# Patient Record
Sex: FEMALE | Race: WHITE | NOT HISPANIC OR LATINO | Employment: OTHER | URBAN - METROPOLITAN AREA
[De-identification: names, ages, dates, MRNs, and addresses within clinical notes are randomized per-mention and may not be internally consistent; named-entity substitution may affect disease eponyms.]

---

## 2017-01-16 ENCOUNTER — TELEPHONE (OUTPATIENT)
Dept: NEUROLOGY | Facility: CLINIC | Age: 57
End: 2017-01-16

## 2017-01-16 NOTE — TELEPHONE ENCOUNTER
Prior Authorization Specialty Medication Request    Medication/Dose: Rebif 44mcg  Frequency: Three times weekly     Route: Subcutaneously   Diagnosis and ICD: Relapsing Remitting Multiple Sclerosis, G35  New/Renewal/Insurance Change PA: Renewal    Important Lab Values: n/a    Previously Tried and Failed Therapies: None    Rationale: Continuation of current disease modifying therapy for demyelinating disease, currently clinically and radiologically stable on, please approve.    Would you like to include any research articles? n/a   If yes please include the hyperlink(s) below or fax @ [phone].    (Include Name and MRN)    If you received a fax notification from an outside Pharmacy;  Pharmacy Name: n/a  Pharmacy #: n/a  Pharmacy Fax: n/a    Patient's ID: [id number]

## 2017-01-16 NOTE — TELEPHONE ENCOUNTER
[facility] Prior Authorization Team   Phone: [phone]  Fax: [phone]      RECEIVED QUESTION SET. COMPLETED BELOW FORM AND MANUALLY FAXED TO [facility].

## 2017-01-17 NOTE — TELEPHONE ENCOUNTER
[facility] Prior Authorization Team   Phone: [phone]  Fax: [phone]    Prior Authorization Approval    Authorization Effective Date: 1/16/2017  Authorization Expiration Date: 1/16/2019  Medication: Rebif 44mcg - Approved  Approved Dose/Quantity: 6 per 28 days  Reference #:     Insurance Company: [employer]  Expected CoPay:       CoPay Card Available:      Foundation Assistance Needed:    Which Pharmacy is filling the prescription (Not needed for infusion/clinic administered): [facility] - [address]

## 2017-01-19 ENCOUNTER — TELEPHONE (OUTPATIENT)
Dept: NEUROLOGY | Facility: CLINIC | Age: 57
End: 2017-01-19

## 2017-01-19 NOTE — TELEPHONE ENCOUNTER
I received a VMM from Lydia stating that she is having issues getting her medications; I called her, and she started to explain it to me, but I advised that I would Jazmín in our pharmacy department call her; I advised her to call [facility] in the meantime to get more of her Rebif, as she is down to 1 syringe; Lydia was fine with this; I talked with Jazmín, and she said that this is complicated and that she will call Lydia to go through this with her, and then let me know if there is anything I need to do.    Milana Lewis, MS RN Care Coordinator

## 2017-01-20 NOTE — TELEPHONE ENCOUNTER
Left message for Lydia to discuss how to obtain Ampyra and Rebif. Appears to be a pharmacy insurance change to [facility]. The Rebif was approved and we should complete the Ampyra PA through [facility] too. Possible new pharmacy to [facility] also.

## 2017-01-23 ENCOUNTER — MEDICAL CORRESPONDENCE (OUTPATIENT)
Dept: HEALTH INFORMATION MANAGEMENT | Facility: CLINIC | Age: 57
End: 2017-01-23

## 2017-01-23 ENCOUNTER — TELEPHONE (OUTPATIENT)
Dept: NEUROLOGY | Facility: CLINIC | Age: 57
End: 2017-01-23

## 2017-01-23 NOTE — TELEPHONE ENCOUNTER
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    Would you like to include any research articles? n/a   If yes please include the hyperlink(s) below or fax @ [phone].    (Include Name and MRN)    If you received a fax notification from an outside Pharmacy;  Pharmacy Name: n/a  Pharmacy #: n/a  Pharmacy Fax: n/a

## 2017-01-24 ENCOUNTER — OFFICE VISIT (OUTPATIENT)
Dept: NEUROLOGY | Facility: CLINIC | Age: 57
End: 2017-01-24
Attending: PSYCHIATRY & NEUROLOGY
Payer: COMMERCIAL

## 2017-01-24 VITALS
DIASTOLIC BLOOD PRESSURE: 66 MMHG | BODY MASS INDEX: 18.06 KG/M2 | HEART RATE: 77 BPM | SYSTOLIC BLOOD PRESSURE: 111 MMHG | WEIGHT: 108.4 LBS | HEIGHT: 65 IN

## 2017-01-24 DIAGNOSIS — G35 MULTIPLE SCLEROSIS (H): Primary | ICD-10-CM

## 2017-01-24 DIAGNOSIS — G35 MULTIPLE SCLEROSIS (H): ICD-10-CM

## 2017-01-24 DIAGNOSIS — R26.9 NEUROLOGIC GAIT DISORDER: ICD-10-CM

## 2017-01-24 DIAGNOSIS — F09 COGNITIVE DYSFUNCTION ACCOMPANYING MULTIPLE SCLEROSIS (H): ICD-10-CM

## 2017-01-24 DIAGNOSIS — G35 COGNITIVE DYSFUNCTION ACCOMPANYING MULTIPLE SCLEROSIS (H): ICD-10-CM

## 2017-01-24 LAB
ALBUMIN SERPL-MCNC: 3.8 G/DL (ref 3.4–5)
ALP SERPL-CCNC: 93 U/L (ref 40–150)
ALT SERPL W P-5'-P-CCNC: 28 U/L (ref 0–50)
ANION GAP SERPL CALCULATED.3IONS-SCNC: 7 MMOL/L (ref 3–14)
AST SERPL W P-5'-P-CCNC: 23 U/L (ref 0–45)
BASOPHILS # BLD AUTO: 0 10E9/L (ref 0–0.2)
BASOPHILS NFR BLD AUTO: 0.2 %
BILIRUB SERPL-MCNC: 0.3 MG/DL (ref 0.2–1.3)
BUN SERPL-MCNC: 15 MG/DL (ref 7–30)
CALCIUM SERPL-MCNC: 8.8 MG/DL (ref 8.5–10.1)
CHLORIDE SERPL-SCNC: 104 MMOL/L (ref 94–109)
CO2 SERPL-SCNC: 29 MMOL/L (ref 20–32)
CREAT SERPL-MCNC: 0.58 MG/DL (ref 0.52–1.04)
DIFFERENTIAL METHOD BLD: NORMAL
EOSINOPHIL # BLD AUTO: 0 10E9/L (ref 0–0.7)
EOSINOPHIL NFR BLD AUTO: 0.9 %
ERYTHROCYTE [DISTWIDTH] IN BLOOD BY AUTOMATED COUNT: 12.7 % (ref 10–15)
GFR SERPL CREATININE-BSD FRML MDRD: NORMAL ML/MIN/1.7M2
GLUCOSE SERPL-MCNC: 84 MG/DL (ref 70–99)
HCT VFR BLD AUTO: 37.3 % (ref 35–47)
HGB BLD-MCNC: 12.5 G/DL (ref 11.7–15.7)
IMM GRANULOCYTES # BLD: 0 10E9/L (ref 0–0.4)
IMM GRANULOCYTES NFR BLD: 0.2 %
LYMPHOCYTES # BLD AUTO: 1.3 10E9/L (ref 0.8–5.3)
LYMPHOCYTES NFR BLD AUTO: 27.8 %
MCH RBC QN AUTO: 30 PG (ref 26.5–33)
MCHC RBC AUTO-ENTMCNC: 33.5 G/DL (ref 31.5–36.5)
MCV RBC AUTO: 89 FL (ref 78–100)
MONOCYTES # BLD AUTO: 0.4 10E9/L (ref 0–1.3)
MONOCYTES NFR BLD AUTO: 8.2 %
NEUTROPHILS # BLD AUTO: 2.8 10E9/L (ref 1.6–8.3)
NEUTROPHILS NFR BLD AUTO: 62.7 %
NRBC # BLD AUTO: 0 10*3/UL
NRBC BLD AUTO-RTO: 0 /100
PLATELET # BLD AUTO: 182 10E9/L (ref 150–450)
POTASSIUM SERPL-SCNC: 4.4 MMOL/L (ref 3.4–5.3)
PROT SERPL-MCNC: 7.9 G/DL (ref 6.8–8.8)
RBC # BLD AUTO: 4.17 10E12/L (ref 3.8–5.2)
SODIUM SERPL-SCNC: 140 MMOL/L (ref 133–144)
WBC # BLD AUTO: 4.5 10E9/L (ref 4–11)

## 2017-01-24 PROCEDURE — 36415 COLL VENOUS BLD VENIPUNCTURE: CPT | Performed by: PSYCHIATRY & NEUROLOGY

## 2017-01-24 PROCEDURE — 80053 COMPREHEN METABOLIC PANEL: CPT | Performed by: PSYCHIATRY & NEUROLOGY

## 2017-01-24 PROCEDURE — 85025 COMPLETE CBC W/AUTO DIFF WBC: CPT | Performed by: PSYCHIATRY & NEUROLOGY

## 2017-01-24 PROCEDURE — 40000809 ZZH STATISTIC NO DOCUMENTATION TO SUPPORT CHARGE

## 2017-01-24 ASSESSMENT — PAIN SCALES - GENERAL: PAINLEVEL: MODERATE PAIN (5)

## 2017-01-24 NOTE — Clinical Note
1/24/2017       RE: Lydia William  [address] [place] [address]     Dear Colleague,    Thank you for referring your patient, Lydia William, to the [facility] MULTIPLE SCLEROSIS at [facility]. Please see a copy of my visit note below.    This office note has been dictated.     Again, thank you for allowing me to participate in the care of your patient.      Sincerely,    Mumtaz Doan, DO

## 2017-01-24 NOTE — MR AVS SNAPSHOT
After Visit Summary   1/24/2017    Lydia William    MRN: [id number]           Patient Information     Date Of Birth          1960        Visit Information        Provider Department      1/24/2017 1:30 PM Mumtaz Doan DO [facility] Multiple Sclerosis        Today's Diagnoses     Multiple sclerosis (H)    -  1     Neurologic gait disorder         Cognitive dysfunction accompanying multiple sclerosis (H)            Follow-ups after your visit        Additional Services     NEUROPSYCHOLOGY REFERRAL       Your provider has referred you to:    [facility]: Adult Neuropsychology Clinic (Mental Health Services) - [place] [phone]   [email or web address]    All scheduling is subject to the client's specific insurance plan & benefits, provider/location availability, and provider clinical specialities.  Please arrive 15 minutes early for your first appointment and bring your completed paperwork.    Please be aware that coverage of these services is subject to the terms and limitations of your health insurance plan.  Call member services at your health plan with any benefit or coverage questions.    Please bring the following to your appointment:  >>   Any x-rays, CTs or MRIs which have been performed.  Contact the facility where they were done to arrange for  prior to your scheduled appointment.  Any new CT, MRI or other procedures ordered by your specialist must be performed at a [place] facility or coordinated by your clinic's referral office.    >>   List of current medications   >>   This referral request   >>   Any documents/labs given to you for this referral            PT Referral ([place])       *This therapy referral will be filtered to a centralized scheduling office at [facility] and the patient will receive a call to schedule an appointment at a [place] location most convenient for them. *     [facility]  Services provides Physical Therapy evaluation and treatment and many specialty services across the [facility].  If requesting a specialty program, please choose from the list below.    If you have not heard from the scheduling office within 2 business days, please call [phone] for all locations, with the exception of [place], please call [phone].  Treatment: Evaluation & Treatment  Special Instructions/Modalities: gait assessment training strengthening rom  Special Programs: none    Please be aware that coverage of these services is subject to the terms and limitations of your health insurance plan.  Call member services at your health plan with any benefit or coverage questions.      **Note to Provider:  If you are referring outside of [place] for the therapy appointment, please list the name of the location in the  special instructions  above, print the referral and give to the patient to schedule the appointment.                  Your next 10 appointments already scheduled     Jan 24, 2017  2:00 PM   Lab with UC LAB   [facility] ([facility])    [address] [place] [address]  [address]st [facility] [address] [phone]            Jul 24, 2017  1:30 PM   (Arrive by 1:15 PM)   Return Multiple Sclerosis with RICHARD Linares CNP   [facility] Multiple Sclerosis ([facility])    [address] [place] [address]  3rd [facility] [address] [phone]              Future tests that were ordered for you today     Open Future Orders        Priority Expected Expires Ordered    Comprehensive metabolic panel Routine  1/24/2018 1/24/2017            Who to contact     If you have questions or need follow up information about today's clinic visit or your schedule please contact [facility] MULTIPLE SCLEROSIS directly at [phone].  Normal or non-critical lab and imaging results will be communicated to you by MyChart, letter or phone within 4 business days  "after the clinic has received the results. If you do not hear from us within 7 days, please contact the clinic through [employer] or phone. If you have a critical or abnormal lab result, we will notify you by phone as soon as possible.  Submit refill requests through [employer] or call your pharmacy and they will forward the refill request to us. Please allow 3 business days for your refill to be completed.          Additional Information About Your Visit        Spon[employer] Information     [employer] lets you send messages to your doctor, view your test results, renew your prescriptions, schedule appointments and more. To sign up, go to [email or web address] . Click on \"Log in\" on the left side of the screen, which will take you to the Welcome page. Then click on \"Sign up Now\" on the right side of the page.     You will be asked to enter the access code listed below, as well as some personal information. Please follow the directions to create your username and password.     Your access code is: [email or web address]  Expires: 2017  1:50 PM     Your access code will  in 90 days. If you need help or a new code, please call your [place] clinic or [phone].        Care EveryWhere ID     This is your Care EveryWhere ID. This could be used by other organizations to access your [place] medical records  [id number]        Your Vitals Were     Pulse Height BMI (Body Mass Index)             77 1.645 m (5' 4.75\") 18.17 kg/m2          Blood Pressure from Last 3 Encounters:   17 111/66   16 103/65   16 111/41    Weight from Last 3 Encounters:   17 49.17 kg (108 lb 6.4 oz)   05/14/15 52.164 kg (115 lb)   14 52.663 kg (116 lb 1.6 oz)              We Performed the Following     CBC with platelets differential     NEUROPSYCHOLOGY REFERRAL     PT Referral ([place])        Primary Care Provider Office Phone # Fax #    Garry Echavarria [phone]       [facility] [address] [place] [address]        Thank you!     Thank you for choosing [facility] MULTIPLE SCLEROSIS  for your care. Our goal is always to provide you with excellent care. Hearing back from our patients is one way we can continue to improve our services. Please take a few minutes to complete the written survey that you may receive in the mail after your visit with us. Thank you!             Your Updated Medication List - Protect others around you: Learn how to safely use, store and throw away your medicines at www.disposemymeds.org.          This list is accurate as of: 1/24/17  1:50 PM.  Always use your most recent med list.                   Brand Name Dispense Instructions for use    baclofen 10 MG tablet    LIORESAL    270 tablet    Take 1 tablet (10 mg) by mouth 3 times daily       Dalfampridine 10 MG Tb12     180 tablet    Take 1 tablet (10 mg) by mouth 2 times daily       ibuprofen 200 MG tablet    ADVIL/MOTRIN     Take 1-3 tablets by mouth daily as needed.       interferon beta-1a 44 MCG/0.5ML injection    REBIF    12 Syringe    Inject 0.5 mLs (44 mcg) Subcutaneous three times a week       multivitamin per tablet      Take 1 tablet by mouth daily.

## 2017-01-24 NOTE — Clinical Note
1/24/2017      RE: Lydia William  [address] [place] [address]       HISTORY OF PRESENT ILLNESS:  Followup appointment on Lydia William, whom I see for multiple sclerosis.  The patient states overall she is not doing as well.  Unfortunately, she lost her job and was laid off.  She has not been walking as much in the last month; hence, her balance is even worse, and it has been getting worse over time.  She has not had any physical therapy for about 4 years.  Back in October, she was walking and getting her lunch ready before she went to work, and her toe caught on the rug.  She fell and hit the right side of her face on the table and sustained multiple facial fractures.  She has recovered from that.  Injections with Rebif are going well.  She has some issues with getting her medications on time and causes her a great deal of stress and anxiety.  We did spend a great deal of time going over what to do if she is having issues with getting her medications and the fact that if she misses a dose or 2 that it would not be significantly detrimental to her health.  She has no other acute issues.      MEDICATIONS:  Reviewed and up-to-date in Epic.      REVIEW OF SYSTEMS:  As per History of Present Illness.      PHYSICAL EXAMINATION:   VITAL SIGNS:  Blood pressure 111/66, pulse 77, weight 108 pounds.   GENERAL:  A pleasant, well-developed, well-nourished female in no apparent distress.   CRANIAL NERVES:  II-XII are intact with the exception of chronic lid lag, which has been present for many years.   MOTOR:  Strength is 5/5 in all extremities except for left lower extremity with hip flexors +4/5, knee extensors 5/5, knee flexors 5/5, dorsiflexors +4/5 on the left.   CEREBELLAR:  Accurate finger-to-nose.      IMPRESSION:  Relapsing-remitting multiple sclerosis, stable on Rebif.      PLAN:   1.  CBC and hepatic profile.   2.  Neuropsychological evaluation for her cognitive issues and its ability to impact her  ability to work.   3.  I am going to refer the patient to Physical Therapy for gait stability, assessment training, strengthening and stretching.  The patient is going to follow up in about 6 months with Claudia.         LISA PARR DO             D: 2017 13:54   T: 2017 10:01   MT: eliza      Name:     ALAN JOHNSON   MRN:      [id number]        Account:      [id number]   :      1960           Service Date: 2017      Document: [id number]      This office note has been dictated.

## 2017-01-25 NOTE — PROGRESS NOTES
HISTORY OF PRESENT ILLNESS:  Followup appointment on Lydia William, whom I see for multiple sclerosis.  The patient states overall she is not doing as well.  Unfortunately, she lost her job and was laid off.  She has not been walking as much in the last month; hence, her balance is even worse, and it has been getting worse over time.  She has not had any physical therapy for about 4 years.  Back in October, she was walking and getting her lunch ready before she went to work, and her toe caught on the rug.  She fell and hit the right side of her face on the table and sustained multiple facial fractures.  She has recovered from that.  Injections with Rebif are going well.  She has some issues with getting her medications on time and causes her a great deal of stress and anxiety.  We did spend a great deal of time going over what to do if she is having issues with getting her medications and the fact that if she misses a dose or 2 that it would not be significantly detrimental to her health.  She has no other acute issues.      MEDICATIONS:  Reviewed and up-to-date in Epic.      REVIEW OF SYSTEMS:  As per History of Present Illness.      PHYSICAL EXAMINATION:   VITAL SIGNS:  Blood pressure 111/66, pulse 77, weight 108 pounds.   GENERAL:  A pleasant, well-developed, well-nourished female in no apparent distress.   CRANIAL NERVES:  II-XII are intact with the exception of chronic lid lag, which has been present for many years.   MOTOR:  Strength is 5/5 in all extremities except for left lower extremity with hip flexors +4/5, knee extensors 5/5, knee flexors 5/5, dorsiflexors +4/5 on the left.   CEREBELLAR:  Accurate finger-to-nose.      IMPRESSION:  Relapsing-remitting multiple sclerosis, stable on Rebif.      PLAN:   1.  CBC and hepatic profile.   2.  Neuropsychological evaluation for her cognitive issues and its ability to impact her ability to work.   3.  I am going to refer the patient to Physical Therapy for gait  stability, assessment training, strengthening and stretching.  The patient is going to follow up in about 6 months with Claudia.         LISA PARR DO             D: 2017 13:54   T: 2017 10:01   MT: eliza      Name:     ALAN JOHNSON   MRN:      [id number]        Account:      [id number]   :      1960           Service Date: 2017      Document: [id number]

## 2017-01-26 ENCOUNTER — HOSPITAL ENCOUNTER (OUTPATIENT)
Dept: PHYSICAL THERAPY | Facility: CLINIC | Age: 57
Setting detail: THERAPIES SERIES
End: 2017-01-26
Attending: PSYCHIATRY & NEUROLOGY
Payer: COMMERCIAL

## 2017-01-26 DIAGNOSIS — G35 MS (MULTIPLE SCLEROSIS) (H): Primary | ICD-10-CM

## 2017-01-26 PROCEDURE — 97110 THERAPEUTIC EXERCISES: CPT | Mod: GP | Performed by: PHYSICAL THERAPIST

## 2017-01-26 PROCEDURE — 97112 NEUROMUSCULAR REEDUCATION: CPT | Mod: GP | Performed by: PHYSICAL THERAPIST

## 2017-01-26 PROCEDURE — 97161 PT EVAL LOW COMPLEX 20 MIN: CPT | Mod: GP | Performed by: PHYSICAL THERAPIST

## 2017-01-26 PROCEDURE — 40000719 ZZHC STATISTIC PT DEPARTMENT NEURO VISIT: Performed by: PHYSICAL THERAPIST

## 2017-01-26 NOTE — PROGRESS NOTES
01/26/17 1000   Signing Clinician's Name / Credentials   Signing clinician's name / credentials Rodriguez Omer PT   Dynamic Gait Index (Louis and Torres [place], 1995)   Gait Level Surface 2  (gait deviation)   Change in Gait Speed 1  (minor change in walking speed)   Gait and Horizontal Head Turns 3   Gait with Vertical Head Turns 3   Gait and Pivot Turns 3   Step Over Obstacle 3   Step Around Obstacles 3   Steps 2   Total Dynamic Gait Index Score  (A score of 19 or less has been correlated to an increased risk of falls in community dwelling older adults, patients with vestibular disorders, and patients with MS.)   Total Score (out of 24) 20

## 2017-01-28 NOTE — PROGRESS NOTES
" 01/26/17 0800   Quick Adds   Type of Visit Initial OP PT Evaluation   General Information   Start of Care Date 01/26/17   Referring Physician Dr. Mumtaz Daon   Orders Evaluate and Treat as Indicated   Order Date 01/24/17   Medical Diagnosis MS, neurologic gait disorder   Onset of illness/injury or Date of Surgery 01/01/87   Precautions/Limitations (MS)   Surgical/Medical history reviewed Yes   Pertinent history of current problem (include personal factors and/or comorbidities that impact the POC) Lydia presents with orders as above. She has a history of MS.  Dx in 1987.  She takes baclofen. She received PT in 2012.  She has a history of Cholecystectomy.  She reports decline in functional activity tolerance.   She fell in Oct catching her toe on the carpet, sustained multiple facial fractures on the right.    Pertinent Visual History  states that when first diagnosed with MS she had double vision but states that has resolved and doesn't have visual impairments currently.    Prior level of function comment Difficulty with gait and balance, continues to have this .  She gets fatigued easier and is having greater difficulty tolerating sitting or standing for periods of time.  HOwever, she states that standing is after 2 hours.    Previous/Current Treatment Physical Therapy   Improvement after PT Moderate   Current Community Support Family/friend caregiver  (yard service - [facility] home. )   Patient role/Employment history Unemployed;Disabled   [facility]   Home/Community Accessibility Comments town home 2 stair entry without a rail,  flight of stairs within home iwth rail   Current Assistive Devices (hAS A 4ww)   Assistive Devices Comments has a 4WW but doesn't use it.     Patient/Family Goals Statement Goals - \"not really sure.\"  \"wasnt' thinking about PT but Dr. Doan thought it would be good to come in for PT as it has been a few years since the last time\"  States she lost her job due to a work " force reduction.  She states that however towards the end she was having difficulty walking from the parking lot into the facilitu  about a block and a half.   States that she can tolerate standing for about a couple hours but then really feels it and points to the thighs.  If stands for a long time she states it is harder to walk after standing about 2 hours.    General Information Comments Heat and extreme cold exacerbate her symptoms with MS.   Decreased function.    Fall Risk Screen   Fall screen completed by PT   Per patient - Fall 2 or more times in past year? Yes  (fell 10/18/16 getting lunch ready caught toe. )   Per patient - Fall with injury in past year? Yes   Is patient a fall risk? Yes   Fall screen comments multiple fascial fractures with the Oct fall   System Outcome Measures   AM-PAC  Basic Mobility Score Level  (Lower scores equate to lower levels of function) 54.67   AM-PAC  Daily Activity Score Level  (Lower scores equate to lower levels of function) 56.66   AM-PAC  Applied Cognitive Score Level  (Lower scores equate to lower levels of function) 43.4   Pain   Patient currently in pain No   Cognitive Status Examination   Orientation orientation to person, place and time   Level of Consciousness alert   Personal Safety and Judgment intact   Cognitive Comment states she has to write things down,  she will be having a neuropsych test    Integumentary   Integumentary Comments no issues   Posture   Posture Comments sitting rounded shoulders, forward head. standing pelvicshift to the left. R gr. trochanter lower on the right,  shoulder higher right,  anterior pelvic tilt with increased lumbar lordosis.    Range of Motion (ROM)   ROM Comment decreased ankle dorsiflexion lacks about 5 degrees. on the left and right to neutral passively  possiblehip flexor tightness    Strength   Strength Comments strength hip flexion 3+-4/5 B, knee extension 5/5 B, R hamstring sitting manual resistance 4-/5,  L 4/5, ankle  dorsiflexion 5/5 B.    Prone knee flexion 4/5 L   R4+/5,  hip abduction 4/5 B,  hip extension 4/5 B.   Functionally decreased hip stabilizer strength, decreased activity tolerance with fatigue.   decreased pelvic floor activation iwth pelvictilt   Standing single heel raise x 7 B lightUE support.   Decreased hasmtring activation noted on the right with standing balance challenges.    Bed Mobility   Bed Mobility Comments states independent but benifits from stretching before getting out of bed in the morning  to loosen up   Transfer Skills   Transfer Comments IND wihtout use of UE withsit to stand.  Keeps COM slighlty posterior with initial sit,  slight L sided bias.    Locomotion   Wheel Chair Mobility Comments n/a   Gait   Gait Comments ambulation with wider ELADIO, forward COM and foot flat initial contact, decreased knee flexion throughout gait and B, rigid trunk and UE   Gait Special Tests 25 Foot Timed Walk   Seconds 7.81   Steps 15 Steps   Comments no device   Gait Special Tests Dynamic Gait Index   Score out of 24 20   Comments no device. see separate documentation   Balance   Balance Comments standing rhomberg good EO and EC   with passivemovement from therapist pt resists A/P  movements of COM over ELADIO.    Balance Special Tests Single Leg Stance Right,   Right, seconds 1.5 Seconds   Balance Special Tests Single Leg Stance Left   Left, seconds 0.87 Seconds   Balance Special Tests Sit to Stand Reps in 30 Seconds   Reps in 30 seconds 8   Height 18   Comments arms across the chest   Sensory Examination   Sensory Perception Comments states maybe a little numbness front of the legs and knee area.   more on the left but in the both   Coordination   Coordination Comments decreased control and coordination R and L with circles on the floor, alternate toe tapping .  slower motor processing for lower body motions.  Decreased interlimb coordination and grading of muscle activation with gait .    Muscle Tone   Muscle Tone  Comments possible 1-2 beat clonus on the left.    Modality Interventions   Planned Modality Interventions Comments per therapist discrestion including pool PT   Planned Therapy Interventions   Planned Therapy Interventions balance training;gait training;motor coordination training;neuromuscular re-education;prosthetic fitting/training;strengthening;stretching;ROM;transfer training;manual therapy   Clinical Impression   Criteria for Skilled Therapeutic Interventions Met yes, treatment indicated   PT Diagnosis decrease in functional activity tolerance and gait impairments.    Influenced by the following impairments weakness core, LE's , impaired coordination and motor planning LE, decreased balance control, muscle shortening LE   Functional limitations due to impairments decreased dynamic balance, fall risk, decreased tolerance to standing , sitting without symptoms of fatigue or LE discomfort.    Clinical Presentation Stable/Uncomplicated   Clinical Presentation Rationale no exacerbations with MS   Clinical Decision Making (Complexity) Low complexity   Therapy Frequency 1 time/week   Predicted Duration of Therapy Intervention (days/wks) 8 weeks   Risk & Benefits of therapy have been explained Yes   Patient, Family & other staff in agreement with plan of care Yes   Clinical Impression Comments Pt would benifit from OT eval and treat for further assessment of memory and cognitive strategies,  energy conservation    Education Assessment   Preferred Learning Style Listening;Reading;Demonstration;Pictures/video  (active participation)   Goal 1   Goal Identifier 1 HEP   Goal Description Lydia will be independent in appropriate HEP to address her impairments to improve her function and activity tolerance.    Target Date 03/24/17   Goal 2   Goal Identifier 2  DGI   Goal Description Lydia will increase her DGI score to 22/24 or greater to demonstrate improved dynamic gait balance and motor control for ADL's and gait safety    Target Date 03/24/17   Goal 3   Goal Identifier 3 SLS   Goal Description Lydia will perform SLS for 6 second or greater B LE to demonstrate improved functional LE strenght and balance for greater safety with gait, stairs, curbs, donning pants in standing.    Target Date 03/24/17   Goal 4   Goal Identifier 4  AMPAC   Goal Description Lydia to increase her basic mobility AMPAC score by 2 points or greater to demonstrate improved functional mobility.    Target Date 03/24/17   Goal 5   Goal Identifier 5-  [facility]   Goal Description Lydia to demonstrate the ability to  objects off the floor without LOB and without UE support and to subjectively report greater ease and confidence in doing so to decrease fall risk and increase her mobility and safety.    Target Date 03/24/17   Total Evaluation Time   Total Evaluation Time (Minutes) 35

## 2017-01-30 NOTE — TELEPHONE ENCOUNTER
[facility] Prior Authorization Team   Phone: [phone]  Fax: [phone]    PA Initiation    Medication: Ampyra 10mg  Insurance Company: [employer] - Phone [phone] Fax [phone]  Pharmacy Filling the Rx: [facility] - [address]  Filling Pharmacy Phone: [phone]  Filling Pharmacy Fax: [phone]  Start Date: 1/30/2017    Waiting on questions to generate in order to complete prior authorization initation.

## 2017-01-31 NOTE — TELEPHONE ENCOUNTER
PA Initiation    Medication: Ampyra 10mg  Insurance Company: [employer] - Phone [phone] Fax [phone]  Pharmacy Filling the Rx: [facility] - [address]  Filling Pharmacy Phone: [phone]  Filling Pharmacy Fax: [phone]  Start Date: 1/30/2017

## 2017-02-01 NOTE — TELEPHONE ENCOUNTER
PA Initiation    Medication: Ampyra 10mg - PA Not Needed  Insurance Company: [employer] - Phone [phone] Fax [phone]  Pharmacy Filling the Rx: [facility] - [address]  Filling Pharmacy Phone: [phone]  Filling Pharmacy Fax: [phone]  Start Date: 1/30/2017    Prescription is refill too soon until 2/9/17.

## 2017-02-02 ENCOUNTER — HOSPITAL ENCOUNTER (OUTPATIENT)
Dept: PHYSICAL THERAPY | Facility: CLINIC | Age: 57
Setting detail: THERAPIES SERIES
End: 2017-02-02
Attending: PSYCHIATRY & NEUROLOGY
Payer: COMMERCIAL

## 2017-02-02 PROCEDURE — 97110 THERAPEUTIC EXERCISES: CPT | Mod: GP | Performed by: PHYSICAL THERAPIST

## 2017-02-02 PROCEDURE — 40000719 ZZHC STATISTIC PT DEPARTMENT NEURO VISIT: Performed by: PHYSICAL THERAPIST

## 2017-02-09 ENCOUNTER — HOSPITAL ENCOUNTER (OUTPATIENT)
Dept: PHYSICAL THERAPY | Facility: CLINIC | Age: 57
Setting detail: THERAPIES SERIES
End: 2017-02-09
Attending: PSYCHIATRY & NEUROLOGY
Payer: COMMERCIAL

## 2017-02-09 PROCEDURE — 97110 THERAPEUTIC EXERCISES: CPT | Mod: GP | Performed by: PHYSICAL THERAPIST

## 2017-02-09 PROCEDURE — 40000719 ZZHC STATISTIC PT DEPARTMENT NEURO VISIT: Performed by: PHYSICAL THERAPIST

## 2017-02-09 NOTE — TELEPHONE ENCOUNTER
Prior Authorization Approval    Authorization Effective Date: 2/8/2017  Authorization Expiration Date:    Medication: Ampyra 10mg - PA Not Needed  Approved Dose/Quantity: 60 PER 30 DAYS  Reference #: [id number]   Insurance Company: [employer] - Phone [phone] Fax [phone]  Expected CoPay:       CoPay Card Available:      Foundation Assistance Needed:    Which Pharmacy is filling the prescription (Not needed for infusion/clinic administered): [facility] - [address] [facility]  Pharmacy Notified: Yes  Patient Notified: Yes

## 2017-02-10 ENCOUNTER — TELEPHONE (OUTPATIENT)
Dept: NEUROLOGY | Facility: CLINIC | Age: 57
End: 2017-02-10

## 2017-02-10 DIAGNOSIS — G35 MULTIPLE SCLEROSIS (H): Primary | ICD-10-CM

## 2017-02-10 RX ORDER — DALFAMPRIDINE 10 MG/1
10 TABLET, FILM COATED, EXTENDED RELEASE ORAL 2 TIMES DAILY
Qty: 180 TABLET | Refills: 3 | Status: SHIPPED | OUTPATIENT
Start: 2017-02-10 | End: 2017-08-17

## 2017-02-10 NOTE — TELEPHONE ENCOUNTER
Received refill request for Ampyra from [facility]; Patient was last seen in January and has follow up appointment in July with Claudia Larsen CNP; Refilled for 1 year per MS refill protocol.    Aimee Wilson MS RN Care Coordinator

## 2017-02-10 NOTE — TELEPHONE ENCOUNTER
[facility] Prior Authorization Team   Phone: [phone]  Fax: [phone]      Prior Authorization Approval    Authorization Effective Date: 2/7/2017  Authorization Expiration Date: 8/7/2017  Medication: Ampyra - Approved  Approved Dose/Quantity: 60 per 20 days  Reference #: [id number]   Insurance Company: [employer] ([facility]) - Phone [phone] Fax [phone]  Expected CoPay:       CoPay Card Available:      Foundation Assistance Needed:    Which Pharmacy is filling the prescription (Not needed for infusion/clinic administered):    Pharmacy Notified:    Patient Notified:

## 2017-02-10 NOTE — TELEPHONE ENCOUNTER
Received refill request for Rebif from [email or web address]; Patient was last seen in January by Dr Doan and has follow up appointment in July with Claudia Larsen; Refilled for 1 year per MS refill protocol.    Milana Lewis, MS RN Care Coordinator

## 2017-02-16 ENCOUNTER — HOSPITAL ENCOUNTER (OUTPATIENT)
Dept: PHYSICAL THERAPY | Facility: CLINIC | Age: 57
Setting detail: THERAPIES SERIES
End: 2017-02-16
Attending: PSYCHIATRY & NEUROLOGY
Payer: COMMERCIAL

## 2017-02-16 PROCEDURE — 40000719 ZZHC STATISTIC PT DEPARTMENT NEURO VISIT: Performed by: PHYSICAL THERAPIST

## 2017-02-16 PROCEDURE — 97112 NEUROMUSCULAR REEDUCATION: CPT | Mod: GP | Performed by: PHYSICAL THERAPIST

## 2017-02-16 PROCEDURE — 97110 THERAPEUTIC EXERCISES: CPT | Mod: GP | Performed by: PHYSICAL THERAPIST

## 2017-02-16 PROCEDURE — 97116 GAIT TRAINING THERAPY: CPT | Mod: GP | Performed by: PHYSICAL THERAPIST

## 2017-02-20 ENCOUNTER — OFFICE VISIT (OUTPATIENT)
Dept: NEUROPSYCHOLOGY | Facility: CLINIC | Age: 57
End: 2017-02-20

## 2017-02-20 DIAGNOSIS — G35 MS (MULTIPLE SCLEROSIS) (H): Primary | ICD-10-CM

## 2017-02-20 NOTE — NURSING NOTE
The patient was seen for neuropsychological evaluation at the request of Mumtaz Doan for the purposes of diagnostic clarification and treatment planning.  One hour of face-to-face testing were provided by this writer.  Please see Dr. Trinity Anaya's report for a full interpretation of the findings.  Bárbara Chase  Psychometrist

## 2017-02-20 NOTE — MR AVS SNAPSHOT
After Visit Summary   2/20/2017    Lydia William    MRN: [id number]           Patient Information     Date Of Birth          1960        Visit Information        Provider Department      2/20/2017 9:00 AM Trinity Anaya LP [facility] Neuropsychology        Today's Diagnoses     MS (multiple sclerosis) (H)    -  1       Follow-ups after your visit        Your next 10 appointments already scheduled     Mar 02, 2017  8:00 AM CST   Treatment 45 with Milana Omer, PT   [facility] ([facility]    [address] [address]   [phone]            Mar 09, 2017  9:00 AM CST   Treatment 45 with Milana mOer, PT   [facility]    [address] [address]   [phone]            Mar 16, 2017  9:00 AM CDT   Treatment 45 with Milana Omer, PT   [facility]    [address] [address]   [phone]            Mar 23, 2017  9:00 AM CDT   Treatment 45 with Milana Omer, PT   [facility]    [address] [address]   [phone]            Jul 27, 2017 12:00 PM CDT   (Arrive by 11:45 AM)   Return Multiple Sclerosis with RICHARD Linares CNP   [facility] Multiple Sclerosis ([facility])    [address] [place] [address] [phone]              Who to contact     Please call your clinic at [phone] to:    Ask questions about your health    Make or cancel appointments    Discuss your medicines    Learn about your test results    Speak to your doctor   If you have compliments or concerns about an experience at your clinic, or if you wish to file a complaint, please contact [facility] Patient Relations at [phone] or email us at  [email or web address]         Additional Information About Your Visit        [employer] Information     [employer] gives you secure access to your electronic health record. If you see a primary care provider, you can also send messages to your care team and make appointments. If you have questions, please call your primary care clinic.  If you do not have a primary care provider, please call [phone] and they will assist you.      VIVA is an electronic gateway that provides easy, online access to your medical records. With VIVA, you can request a clinic appointment, read your test results, renew a prescription or communicate with your care team.     To access your existing account, please contact your [facility] or call [phone] for assistance.        Care EveryWhere ID     This is your Care EveryWhere ID. This could be used by other organizations to access your [facility] medical records  [id number]         Blood Pressure from Last 3 Encounters:   01/24/17 111/66   07/27/16 103/65   01/06/16 111/41    Weight from Last 3 Encounters:   01/24/17 49.2 kg (108 lb 6.4 oz)   05/14/15 52.2 kg (115 lb)   11/20/14 52.7 kg (116 lb 1.6 oz)              We Performed the Following     [id number] TESTING, PER HR/PSYCHOLOGIST     NEUROPSYCH TESTING BY [facility]        Primary Care Provider Office Phone # Fax #    Garry Echavarria [phone]       [facility] [address] [place] [address]        Thank you!     Thank you for choosing [facility] NEUROP[facility]CH[facility]Y  for your care. Our goal is always to provide you with excellent care. Hearing back from our patients is one way we can continue to improve our services. Please take a few minutes to complete the written survey that you may receive in the mail after your visit with us. Thank you!             Your Updated Medication List - Protect others around you: Learn how to safely use, store and throw  away your medicines at www.disposemymeds.org.          This list is accurate as of: 2/20/17 11:59 PM.  Always use your most recent med list.                   Brand Name Dispense Instructions for use    baclofen 10 MG tablet    LIORESAL    270 tablet    Take 1 tablet (10 mg) by mouth 3 times daily       Dalfampridine 10 MG Tb12     180 tablet    Take 1 tablet (10 mg) by mouth 2 times daily       ibuprofen 200 MG tablet    ADVIL/MOTRIN     Take 1-3 tablets by mouth daily as needed.       interferon beta-1a 44 MCG/0.5ML injection    REBIF    12 Syringe    Inject 0.5 mLs (44 mcg) Subcutaneous three times a week       multivitamin per tablet      Take 1 tablet by mouth daily.

## 2017-02-23 ENCOUNTER — HOSPITAL ENCOUNTER (OUTPATIENT)
Dept: PHYSICAL THERAPY | Facility: CLINIC | Age: 57
Setting detail: THERAPIES SERIES
End: 2017-02-23
Attending: PSYCHIATRY & NEUROLOGY
Payer: COMMERCIAL

## 2017-02-23 PROCEDURE — 40000719 ZZHC STATISTIC PT DEPARTMENT NEURO VISIT: Performed by: PHYSICAL THERAPIST

## 2017-02-23 PROCEDURE — 97110 THERAPEUTIC EXERCISES: CPT | Mod: GP | Performed by: PHYSICAL THERAPIST

## 2017-03-01 NOTE — PROGRESS NOTES
Neuropsychology Laboratory  [address], [facility] [address]  [place], [place]  [id number] [phone]    NEUROPSYCHOLOGICAL EVALUATION      RELEVANT HISTORY AND REASON FOR REFERRAL:    Lydia William is a 56-year-old  white woman with a 30 year history of multiple sclerosis, now concerned about cognitive impairment.  A brain MRI collected on 7/27/16 showed greater than 20 foci of T2-hyperintensities within the cerebral white matter, cerebellar white matter, and brain stem, consistent with demyelinating disease.  There was no change since she was last scanned on 5/1/14.  This neuropsychological evaluation is requested by her neurologist, Dr. Mumtaz Doan, to help with treatment planning.      The youngest of four children, she was born in [place], [place] and grew up in the [facility], reared by her parents.  Her father was a bhatti, her mother held assorted jobs including .  Ms. William didn t quite finish high school, dropping out in the twelfth grade, but reports she was an average student.  She never pursued a GED.  For over 38 years she worked for [employer], in various capacities.  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  She is now pursuing a disability claim through Social Security.  Her first marriage, at age 18, ended in divorce after a year.  There is a 38-year-old daughter from that marriage.  She s been  to her current  for 12 years and lives with him in [place], [place].      BEHAVIORAL OBSERVATIONS AND CLINICAL INTERVIEW FINDINGS:    She arrived 20 minutes early, presenting as a petite middle-aged woman with shoulder length hair, wearing makeup, neatly dressed in a white top, blue jacket, and jeans.  Mood was euthymic to mildly dysphoric, affect somewhat blunted.  She was slow to gather thoughts.      MS was diagnosed in 1987 when she came to medical attention  with vision problems.  She was stable for many years until around 2006 when she had a flare-up of symptoms and was started on Rebif.  She has remained on that medication.  Ongoing physical symptoms are fatigue and difficulty walking, her legs stiffening up in cold weather (a knee condition may be partly to blame).  She is prone to fall if she tries to move too quickly.  Last October she fell and sustained multiple facial fractures.  The year before that she had a fall at work.  There is some urinary urgency.  Cognitively, memory is unreliable, and she s come to depend on notes.  Memory for conversations and places is subnormal.  She tries to be somewhat methodical in her habits, always parking in the same place for example, to compensate.  Since losing her job late last year, she s considering options including Social Security Disability versus finding another job which may be less demanding.      Per her reports, the medical history is otherwise unremarkable for the purposes of this evaluation, with no other known injuries or diseases of the brain or chronic illnesses requiring regular treatment.  Surgeries include removal of a facial cyst and cholecystectomy.        Psychiatric problems of any kind were denied, and she s never sought mental health treatment.      She smoked less than a pack a day on average for 15 to 20 years before quitting in 1991.  She tried marijuana and cocaine in the distant past.  Ongoing drug use is denied.  Alcohol use is typically less than a glass a wine weekly.  Alcohol has never been a heavy or problematic habit.      Family history is mentionable for a grandfather with Parkinson s disease, her mother who had heart disease, and a sister with diabetes.      Throughout the test session she continued to evince a rather flat affect but was easily engaged and had no trouble understanding or following directives.  She was sufficiently alert and attentive.  Word searching was noticeable on  language tests.  The right knee was very stiff when she walked.  Effort level seemed adequate throughout and the results are considered technically valid.      NEUROPSYCHOLOGICAL FINDINGS:    Select intellectual abilities were assessed with subtests from the Wechsler Adult Intelligence Scale-IV.  Speeded visual attention (Symbol Search) was impaired.  She was both slow and slightly inaccurate on this timed test requiring horizontal visual scanning.  Word knowledge and expressive communicability (Vocabulary) and visuospatial processing and constructional abilities (Block Design) were borderline impaired.  Speeded graphomotor learning (Coding) was below average.  Auditory attention span on a digit sequence learning exercise (Digit Span) was low average.  She could repeat up to six digits in the forward direction and three (inconsistently) in the reverse order.      Memory was poor.  Immediate memory for two story passages from the Wechsler Memory Scale-Revised was impaired with just eight of 50 story elements recalled immediately after presentation.  Retention of the stories 30 minutes later was also impaired; nothing was remembered from the first story despite cueing, and only 3/25 details were recalled from the second story.  Acquisition of a lengthy word list (Eloy Auditory Verbal Learning Test) was borderline impaired for learning over trials with just eight of 15 words learned by the last trial.  Retention of the list was low average after a brief distractor exercise, and below average after a longer delay.  Thirteen of the 15 words were correctly recognized when presented among a list of foils; four additional words were incorrectly selected.  Immediate memory for figural material (four designs from the WMS) was impaired, her drawings micrographic and poorly executed.  Most of the meager information originally learned was retained 30 minutes later, and all four figures were correctly recognized when presented in  multiple choice format.      Performance on a simple numerical sequencing exercise (Trail Making Test Part A), requiring sustained attention, was average for speed and error free.  Performance on a more complex sequencing exercise (Trail Making Test Part B), requiring divided attention (alternating between number and letter sequences), was low average for speed with one error involving loss of set.  Verbal associative fluency on the Controlled Oral Word Association Test (generating words beginning with target letters) was below average to mildly impaired.   Inductive reasoning on a one-deck version of the Wisconsin Card Sorting Test was impaired with no categories abstracted.      Finger tapping speeds were average bilaterally, the left (nondominant) hand slightly faster than the right.  Fine motor speed and dexterity (Grooved Pegboard) was moderately impaired bilaterally, the left hand substantially faster than the right.  A variety of brief exercises requiring sustained attention and cognitive flexibility (Test of Sustained Attention and Tracking) were completed slowly with two errors, made reciting the alphabet.  Speeded word reading and color naming (Stroop Color-Word Test) were impaired while color naming involving suppression of a more familiar response was low average.      CONCLUSIONS AND RECOMMENDATIONS:    This 56-year-old woman with a 30 year history of MS was laid off from her job in December, 2016 and is now contemplating a disability claim.  Ongoing MS symptoms include fatigue, leg weakness contributing to falls with injuries, urinary urgency, and memory impairment.  The most recent brain MRI, taken last summer, showed more than 20 white matter hyperintensities in the cerebral and cerebellar white matter and brain stem, consistent with demyelinating disease.      Test findings are abnormal.  Overall intellectual functioning is estimated to be in the borderline impaired range.  Learning and long-term  retention of story passages, word lists, and figural material are impaired.  She is slow on most timed cognitive tasks, including those requiring sustained and divided attention and word retrieval.  Finger tapping speeds are grossly within normal limits bilaterally, but the left (nondominant) hand is faster than the right, contrary to the usual expectations.  Fine motor dexterity is greatly slowed for both hands, the left hand much faster than the right.  Inductive reasoning is impaired.      Test findings suggest fairly extensive brain disease with particular implications for subcortical brain regions, consistent with MS.  Given the combination of physical limitations (psychomotor slowing, poor hand dexterity), cognitive slowing and fairly severe memory impairment, I think it would be difficult for her to find a vocational niche.  I defer to Dr. Doan and her primary care provider in addressing her physical capabilities, but from a cognitive standpoint pursuit of Social Security Disability seems quite reasonable.        Elva [email or web address].[email or web address].   Licensed Psychologist, L.P. 1553  Diplomate in Clinical Neuropsychology, [facility]    The diagnostic impression for the purposes of this evaluation is multiple sclerosis.  This evaluation included approximately three hours of testing administered by a psychometrist with interpretation by a neuropsychologist (CPT 38717) and an additional three hours of professional time spent on the interview, data integration, record review, and report preparation (CPT 81685).    DDR: (AST)

## 2017-03-01 NOTE — PROGRESS NOTES
WECHSLER ADULT INTELLIGENCE SCALE-IV CONTROLLED WORD ASSOCIATION TEST        Age-Scaled Score Score    29    Vocabulary            6     Digit Span  8  TRAIL MAKING TEST   Block Design  6     Coding  7   Time Errors   Symbol Search  4   A    37    0      B  102    1    WECHSLER MEMORY SCALES     PSYCHOMOTOR TESTS   Logical Mem Immediate  4/4     Logical Mem 30 Minute  0/3   Right Left   Visual Repr Immediate                 4        Finger Tapping    34      36      Visual Repr 30                  3    Grooved Pegboard  176          148           30 Minute Recognition                 4     Drops: 3           Drops: 2         TEST OF SUSTAINED ATTENTION & TRACKING STROOP COLOR-WORD TEST                                T-Score    Total Time          101   Word                 33          Total Errors     2      Color                 32          Color-Word           46         JCARLOS AUDITORY VERBAL LEARNING TEST     WISCONSIN CARD SORTING TEST     I  II  III IV  V VI VII     6   7   7   7   8   5   7      # of categories     0         % perseverative   14     30 Minute Recall    6      30 Minute Recognition         13     Intrusions           4

## 2017-03-02 ENCOUNTER — HOSPITAL ENCOUNTER (OUTPATIENT)
Dept: PHYSICAL THERAPY | Facility: CLINIC | Age: 57
Setting detail: THERAPIES SERIES
End: 2017-03-02
Attending: PSYCHIATRY & NEUROLOGY
Payer: COMMERCIAL

## 2017-03-02 PROCEDURE — 97110 THERAPEUTIC EXERCISES: CPT | Mod: GP | Performed by: PHYSICAL THERAPIST

## 2017-03-02 PROCEDURE — 40000719 ZZHC STATISTIC PT DEPARTMENT NEURO VISIT: Performed by: PHYSICAL THERAPIST

## 2017-03-09 ENCOUNTER — HOSPITAL ENCOUNTER (OUTPATIENT)
Dept: PHYSICAL THERAPY | Facility: CLINIC | Age: 57
Setting detail: THERAPIES SERIES
End: 2017-03-09
Attending: PSYCHIATRY & NEUROLOGY
Payer: COMMERCIAL

## 2017-03-09 PROCEDURE — 40000719 ZZHC STATISTIC PT DEPARTMENT NEURO VISIT: Performed by: PHYSICAL THERAPIST

## 2017-03-09 PROCEDURE — 97112 NEUROMUSCULAR REEDUCATION: CPT | Mod: GP | Performed by: PHYSICAL THERAPIST

## 2017-03-09 PROCEDURE — 97110 THERAPEUTIC EXERCISES: CPT | Mod: GP | Performed by: PHYSICAL THERAPIST

## 2017-03-16 ENCOUNTER — HOSPITAL ENCOUNTER (OUTPATIENT)
Dept: PHYSICAL THERAPY | Facility: CLINIC | Age: 57
Setting detail: THERAPIES SERIES
End: 2017-03-16
Attending: PSYCHIATRY & NEUROLOGY
Payer: COMMERCIAL

## 2017-03-16 PROCEDURE — 97112 NEUROMUSCULAR REEDUCATION: CPT | Mod: GP

## 2017-03-16 PROCEDURE — 97110 THERAPEUTIC EXERCISES: CPT | Mod: GP

## 2017-03-16 PROCEDURE — 40000185 ZZHC STATISTIC PT OUTPT VISIT

## 2017-03-23 ENCOUNTER — HOSPITAL ENCOUNTER (OUTPATIENT)
Dept: PHYSICAL THERAPY | Facility: CLINIC | Age: 57
Setting detail: THERAPIES SERIES
End: 2017-03-23
Attending: PSYCHIATRY & NEUROLOGY
Payer: COMMERCIAL

## 2017-03-23 PROCEDURE — 97110 THERAPEUTIC EXERCISES: CPT | Mod: GP | Performed by: PHYSICAL THERAPIST

## 2017-03-23 PROCEDURE — 97750 PHYSICAL PERFORMANCE TEST: CPT | Mod: GP | Performed by: PHYSICAL THERAPIST

## 2017-03-23 PROCEDURE — 40000719 ZZHC STATISTIC PT DEPARTMENT NEURO VISIT: Performed by: PHYSICAL THERAPIST

## 2017-03-23 NOTE — PROGRESS NOTES
03/23/17 0900   Signing Clinician's Name / Credentials   Signing clinician's name / credentials Milana Omer PT   Dynamic Gait Index (Louis and Torres Buckner, 1995)   Gait Level Surface 2   Change in Gait Speed 2   Gait and Horizontal Head Turns 3   Gait with Vertical Head Turns 3   Gait and Pivot Turns 3   Step Over Obstacle 3   Step Around Obstacles 3   Steps 2  (needs rail)   Total Dynamic Gait Index Score  (A score of 19 or less has been correlated to an increased risk of falls in community dwelling older adults, patients with vestibular disorders, and patients with MS.)   Total Score (out of 24) 21     Dynamic Gait Index (DGI):The DGI is a measure of balance during gait that is reliable and valid for the elderly and individuals with Parkinson's disease, MS, vestibular disorders, or s/p stroke. Gait assistive device used: no device    Patient score: 21/24  Scores ?19/24 indicate an increased risk for falls according to Cassy et al 2000  Minimal Detectable Change = 2.9 in community dwelling elderly according to Eddi et al 2011    Assessment (rationale for performing, application to patient s function & care plan):assessment of progress dynamic balance    Minutes billed as physical performance test: 9

## 2017-03-24 NOTE — PROGRESS NOTES
"Outpatient Physical Therapy Discharge Note     Patient: Lydia William  : 1960    Beginning/End Dates of Reporting Period:  17  to 3/24/2017    Referring Provider: Dr. Mumtaz Doan    Therapy Diagnosis: decrease in functional activity tolerance and gait impairments.      Client Self Report: States she feels that she has improved iwth her walking .  \"More aware of how I should be moving\"  She feels that if she continues with her HEP she will be ok - doens't need to come to clinic for further sessions. Ok with d/c.     Objective Measurements:  Objective Measure: 6 min walk   Details: 1096 feet, no device, fatigue anterior thighs around 4 min,  slight increased ataxia LE noted as well with fatigue.     Objective Measure: SLS  Details: 2 seconds  R and L performing without knee hyperextension, better muscle control   Eval SLS 0.87 seconds L and 1.5 Right    Objective Measure: strength  Details: sitting hip flexion 4+-5/5 B, knee flexion 5/5, knee extension 5/5    eval hip flexion 3+/5 B, knee extension 5/5 B,  Knee flexion 4-/5 R and 4/5 L    Objective Measure: DGI  Details:  on 3/23/17   20/24 at eval                  Outcome Measures (most recent score):   Not retaken at discharge. See eval score.             Goals:  Goal Identifier 1 HEP   Goal Description Lydia will be independent in appropriate HEP to address her impairments to improve her function and activity tolerance.    Target Date 17   Date Met   3/24/17   Progress:Lydia demonstrated independence in HEP for LE , core strengthening and balance.      Goal Identifier 2 DGI   Goal Description Lydia will increase her DGI score to 22/24 or greater to demonstrate improved dynamic gait balance and motor control for ADL's and gait safety   Target Date 17   Date Met   not met fully   Progress: improved from eval as above, but not to score of 22     Goal Identifier 3 SLS   Goal Description Lydia will perform SLS for 6 second or greater B LE to " "demonstrate improved functional LE strenght and balance for greater safety with gait, stairs, curbs, donning pants in standing.    Target Date 03/25/17   Date Met   not met fully   Progress: improved time with SLS B and improved alignment - not hyperextending knee and hip to \"sit on ligaments\"  Anticipate further improvement with continuation of HEP     Goal Identifier 4 [facility]   Goal Description Lydia to increase her basic mobility AMPA score by 2 points or greater to demonstrate improved functional mobility.    Target Date 03/24/17   Date Met      Progress:[facility] not retaken     Goal Identifier 5-  [facility]   Goal Description Lydia to demonstrate the ability to  objects off the floor without LOB and without UE support and to subjectively report greater ease and confidence in doing so to decrease fall risk and increase her mobility and safety.    Target Date 03/24/17   Date Met   [facility] not retaken   Progress:     Goal Identifier 6- 6 minute walk   Goal Description Lydia to ambulate  1253 feet /382 meters to demonstrate significant change in her functional endurance per 6 min walk testing.    Target Date 03/24/17   Date Met      Progress:         Progress Toward Goals:   Lydia progressed with her strength, gait quality and functional movement helping her increase her activity tolerance/endurance.  Goals not met fully as noted above.  She does feel she is able to d;c from skilled services at this time and continue with HEP. No questions at this time.   Barriers to progress include nerve changes with MS.    Plan:  Other: continue with HEP, return with New orders if needed.     Discharge:    Reason for Discharge: no further skilled needs    Equipment Issued: HEP    Discharge Plan: Patient to continue home program.  "

## 2017-04-03 NOTE — ADDENDUM NOTE
Encounter addended by: Milana Omer, PT on: 4/3/2017  9:46 AM<BR>     Actions taken: Pend clinical note

## 2017-04-03 NOTE — ADDENDUM NOTE
Encounter addended by: Milana Omer, PT on: 4/3/2017  3:26 PM<BR>     Actions taken: Episode resolved

## 2017-08-17 ENCOUNTER — OFFICE VISIT (OUTPATIENT)
Dept: NEUROLOGY | Facility: CLINIC | Age: 57
End: 2017-08-17
Attending: NURSE PRACTITIONER
Payer: COMMERCIAL

## 2017-08-17 ENCOUNTER — TELEPHONE (OUTPATIENT)
Dept: NEUROLOGY | Facility: CLINIC | Age: 57
End: 2017-08-17

## 2017-08-17 VITALS
SYSTOLIC BLOOD PRESSURE: 98 MMHG | WEIGHT: 108.9 LBS | OXYGEN SATURATION: 99 % | HEART RATE: 68 BPM | HEIGHT: 64 IN | RESPIRATION RATE: 20 BRPM | DIASTOLIC BLOOD PRESSURE: 56 MMHG | TEMPERATURE: 98.3 F | BODY MASS INDEX: 18.59 KG/M2

## 2017-08-17 DIAGNOSIS — G35 MULTIPLE SCLEROSIS (H): Primary | ICD-10-CM

## 2017-08-17 DIAGNOSIS — G35 MULTIPLE SCLEROSIS (H): ICD-10-CM

## 2017-08-17 LAB
ALBUMIN SERPL-MCNC: 3.6 G/DL (ref 3.4–5)
ALP SERPL-CCNC: 91 U/L (ref 40–150)
ALT SERPL W P-5'-P-CCNC: 24 U/L (ref 0–50)
AST SERPL W P-5'-P-CCNC: 22 U/L (ref 0–45)
BASOPHILS # BLD AUTO: 0 10E9/L (ref 0–0.2)
BASOPHILS NFR BLD AUTO: 0.2 %
BILIRUB DIRECT SERPL-MCNC: 0.1 MG/DL (ref 0–0.2)
BILIRUB SERPL-MCNC: 0.3 MG/DL (ref 0.2–1.3)
DIFFERENTIAL METHOD BLD: NORMAL
EOSINOPHIL # BLD AUTO: 0.1 10E9/L (ref 0–0.7)
EOSINOPHIL NFR BLD AUTO: 1.5 %
ERYTHROCYTE [DISTWIDTH] IN BLOOD BY AUTOMATED COUNT: 13.2 % (ref 10–15)
HCT VFR BLD AUTO: 37.5 % (ref 35–47)
HGB BLD-MCNC: 12.2 G/DL (ref 11.7–15.7)
IMM GRANULOCYTES # BLD: 0 10E9/L (ref 0–0.4)
IMM GRANULOCYTES NFR BLD: 0.2 %
LYMPHOCYTES # BLD AUTO: 1.3 10E9/L (ref 0.8–5.3)
LYMPHOCYTES NFR BLD AUTO: 27.9 %
MCH RBC QN AUTO: 30.1 PG (ref 26.5–33)
MCHC RBC AUTO-ENTMCNC: 32.5 G/DL (ref 31.5–36.5)
MCV RBC AUTO: 93 FL (ref 78–100)
MONOCYTES # BLD AUTO: 0.4 10E9/L (ref 0–1.3)
MONOCYTES NFR BLD AUTO: 9.4 %
NEUTROPHILS # BLD AUTO: 2.8 10E9/L (ref 1.6–8.3)
NEUTROPHILS NFR BLD AUTO: 60.8 %
NRBC # BLD AUTO: 0 10*3/UL
NRBC BLD AUTO-RTO: 0 /100
PLATELET # BLD AUTO: 174 10E9/L (ref 150–450)
PROT SERPL-MCNC: 7.8 G/DL (ref 6.8–8.8)
RBC # BLD AUTO: 4.05 10E12/L (ref 3.8–5.2)
WBC # BLD AUTO: 4.7 10E9/L (ref 4–11)

## 2017-08-17 PROCEDURE — 85025 COMPLETE CBC W/AUTO DIFF WBC: CPT | Performed by: NURSE PRACTITIONER

## 2017-08-17 PROCEDURE — 80076 HEPATIC FUNCTION PANEL: CPT | Performed by: NURSE PRACTITIONER

## 2017-08-17 PROCEDURE — 99213 OFFICE O/P EST LOW 20 MIN: CPT | Mod: ZF

## 2017-08-17 PROCEDURE — 36415 COLL VENOUS BLD VENIPUNCTURE: CPT | Performed by: NURSE PRACTITIONER

## 2017-08-17 RX ORDER — PHENOL 1.4 %
1 AEROSOL, SPRAY (ML) MUCOUS MEMBRANE DAILY
COMMUNITY
Start: 2010-03-18

## 2017-08-17 RX ORDER — DALFAMPRIDINE 10 MG/1
10 TABLET, FILM COATED, EXTENDED RELEASE ORAL 2 TIMES DAILY
Qty: 180 TABLET | Refills: 3 | Status: SHIPPED | OUTPATIENT
Start: 2017-08-17 | End: 2018-02-14

## 2017-08-17 RX ORDER — BACLOFEN 10 MG/1
10 TABLET ORAL 3 TIMES DAILY
Qty: 270 TABLET | Refills: 3 | Status: SHIPPED | OUTPATIENT
Start: 2017-08-17 | End: 2018-02-14

## 2017-08-17 RX ORDER — IBUPROFEN 800 MG
1000 TABLET ORAL DAILY
COMMUNITY

## 2017-08-17 ASSESSMENT — PAIN SCALES - GENERAL: PAINLEVEL: NO PAIN (0)

## 2017-08-17 NOTE — TELEPHONE ENCOUNTER
PA Initiation    Medication: Rebif [facility] APPROVED  Insurance Company: [employer] ([facility]) - Phone [phone] Fax [phone]  Pharmacy Filling the Rx: [facility] [place] [facility] [id number]  Filling Pharmacy Phone:    Filling Pharmacy Fax:    Start Date: 8/17/2017

## 2017-08-17 NOTE — PATIENT INSTRUCTIONS
1. Labs today.    2. Continue rebif.    3. Continue to exercise daily.    4. F/u with Dr. Mccall in 6 months.    5. MRI brain in 1 year

## 2017-08-17 NOTE — TELEPHONE ENCOUNTER
Prior Authorization Specialty Medication Request    Medication/Dose: Rebif 44mcg  Diagnosis and ICD: Relapsing Remitting Multiple Sclerosis, G35  New/Renewal/Insurance Change PA: Renewal    Important Lab Values: n/a    Previously Tried and Failed Therapies: None    Rationale: Continuation of current disease modifying therapy for demyelinating disease, currently clinically and radiologically stable on, please approve.    Would you like to include any research articles? n/a   If yes please include the hyperlink(s) below or fax @ [phone].    (Include Name and MRN)    If you received a fax notification from an outside Pharmacy;  Pharmacy Name: n/a  Pharmacy #: n/a   Pharmacy Fax: n/a

## 2017-08-17 NOTE — NURSING NOTE
"Chief Complaint   Patient presents with     RECHECK     [facility]- MULTIPLE SCLEROSIS, 6 MONTHS F/U       Initial BP 98/56 (BP Location: Left arm, Patient Position: Sitting, Cuff Size: Adult Regular)  Pulse 68  Temp 98.3  F (36.8  C) (Oral)  Resp 20  Ht 1.619 m (5' 3.75\")  Wt 49.4 kg (108 lb 14.4 oz)  SpO2 99%  Breastfeeding? No  BMI 18.84 kg/m2 Estimated body mass index is 18.84 kg/(m^2) as calculated from the following:    Height as of this encounter: 1.619 m (5' 3.75\").    Weight as of this encounter: 49.4 kg (108 lb 14.4 oz).  Medication Reconciliation: complete     Tani Garcia, CMA      "

## 2017-08-17 NOTE — MR AVS SNAPSHOT
After Visit Summary   8/17/2017    Lydia William    MRN: [id number]           Patient Information     Date Of Birth          1960        Visit Information        Provider Department      8/17/2017 12:00 PM Claudia Larsen APRN CNP [facility] Multiple Sclerosis        Today's Diagnoses     Multiple sclerosis (H)    -  1      Care Instructions    1. Labs today.    2. Continue rebif.    3. Continue to exercise daily.    4. F/u with Dr. Mccall in 6 months.    5. MRI brain in 1 year          Follow-ups after your visit        Follow-up notes from your care team     Return in about 6 months (around 2/17/2018).      Your next 10 appointments already scheduled     Aug 17, 2017  1:00 PM CDT   Lab with  LAB   [facility] ([facility])    [address] [place] [id number] [phone]            Feb 14, 2018 11:00 AM CST   (Arrive by 10:45 AM)   New Multiple Sclerosis with Eladio Mccall MD   [facility] Multiple Sclerosis ([facility])    [address] [place] [id number] [phone]              Future tests that were ordered for you today     Open Future Orders        Priority Expected Expires Ordered    CBC with platelets differential Routine  8/17/2018 8/17/2017    Hepatic panel Routine  8/17/2018 8/17/2017            Who to contact     If you have questions or need follow up information about today's clinic visit or your schedule please contact [facility] MULTIPLE SCLEROSIS directly at [phone].  Normal or non-critical lab and imaging results will be communicated to you by MyChart, letter or phone within 4 business days after the clinic has received the results. If you do not hear from us within 7 days, please contact the clinic through [email or web address] or phone. If you have a critical or abnormal lab result, we will notify you by phone as soon as possible.  Submit refill requests through [employer] or  "call your pharmacy and they will forward the refill request to us. Please allow 3 business days for your refill to be completed.          Additional Information About Your Visit        [employer] Information     [employer] gives you secure access to your electronic health record. If you see a primary care provider, you can also send messages to your care team and make appointments. If you have questions, please call your primary care clinic.  If you do not have a primary care provider, please call [phone] and they will assist you.        Care EveryWhere ID     This is your Care EveryWhere ID. This could be used by other organizations to access your [facility] medical records  [id number]        Your Vitals Were     Pulse Temperature Respirations Height Pulse Oximetry Breastfeeding?    68 98.3  F (36.8  C) (Oral) 20 1.619 m (5' 3.75\") 99% No    BMI (Body Mass Index)                   18.84 kg/m2            Blood Pressure from Last 3 Encounters:   08/17/17 98/56   01/24/17 111/66   07/27/16 103/65    Weight from Last 3 Encounters:   08/17/17 49.4 kg (108 lb 14.4 oz)   01/24/17 49.2 kg (108 lb 6.4 oz)   05/14/15 52.2 kg (115 lb)                 Where to get your medicines      These medications were sent to [facility] [address] [address] , [facility] [address]     Phone:  [phone]     baclofen 10 MG tablet         Call your pharmacy to confirm that your medication is ready for pickup. It may take up to 24 hours for them to receive the prescription. If the prescription is not ready within 3 business days, please contact your clinic or your provider.     We will let you know when these medications are ready. If you don't hear back within 3 business days, please contact us.     Dalfampridine 10 MG Tb12    interferon beta-1a 44 MCG/0.5ML injection          Primary Care Provider Office Phone # Fax #    Garry Echavarria MD " [phone]       [facility] [address] [facility] [address]        Equal Access to Services     ANIYAH GARLAND : Hadii aad ku hadmonetrobbie Loryali, wabryantda abdoulrichardha, nilesta kayaquelin mendoza, raisa cedeno laestevanruperto heard. So [facility] [phone].    ATENCIÓN: Si habla español, tiene a bolivar disposición servicios gratuitos de asistencia lingüística. Llame al [phone].    We comply with applicable federal civil rights laws and [place] laws. We do not discriminate on the basis of race, color, national origin, age, disability sex, sexual orientation or gender identity.            Thank you!     Thank you for choosing [facility] MULTIPLE SCLEROSIS  for your care. Our goal is always to provide you with excellent care. Hearing back from our patients is one way we can continue to improve our services. Please take a few minutes to complete the written survey that you may receive in the mail after your visit with us. Thank you!             Your Updated Medication List - Protect others around you: Learn how to safely use, store and throw away your medicines at www.disposemymeds.org.          This list is accurate as of: 8/17/17 12:56 PM.  Always use your most recent med list.                   Brand Name Dispense Instructions for use Diagnosis    baclofen 10 MG tablet    LIORESAL    270 tablet    Take 1 tablet (10 mg) by mouth 3 times daily    Multiple sclerosis (H)       Dalfampridine 10 MG Tb12     180 tablet    Take 1 tablet (10 mg) by mouth 2 times daily    Multiple sclerosis (H)       ibuprofen 200 MG tablet    ADVIL/MOTRIN     Take 1-3 tablets by mouth daily as needed.        interferon beta-1a 44 MCG/0.5ML injection   Start taking on:  8/18/2017    REBIF    12 Syringe    Inject 0.5 mLs (44 mcg) Subcutaneous three times a week    Multiple sclerosis (H)       MULTIVITAMIN WOMEN Tabs      Take 1 tablet by mouth daily        Vitamin D3 400 UNITS Caps      Take 1,000 mg by mouth daily

## 2017-08-17 NOTE — LETTER
8/17/2017       RE: Lydia William  [address] [place] [address]     Dear Colleague,    Thank you for referring your patient, Lydia William, to the [facility] MULTIPLE SCLEROSIS at [facility]. Please see a copy of my visit note below.    [facility] OUTPATIENT MS CLINIC VISIT NOTE    REASON FOR VISIT:  Lydia is a 57-year-old female who returns to the clinic today for regularly scheduled followup of her diagnosis of relapsing-remitting multiple sclerosis.  She was most recently seen by Dr. Doan in 01/2017.      HISTORY OF PRESENT ILLNESS:  Please refer to previous clinic notes for detailed history.  In short, in 1987 patient had an episode of double vision.  She was seen by Ophthalmology.  She was diagnosed with MS after having an imaging study.  She also had a spinal tap.  She is unsure about the findings.  In 2005, her walking was affected.  She started seeing Dr. Vargas in 2006 and was started on Rebif. She continues to be on Rebif. In 01/2017, she had her most recent visit with Dr. Doan. She was referred to physical therapy for balance issues.      INTERVAL HISTORY SINCE LAST VISIT:  Overall Lydia is doing well.  Last month, she was diagnosed with shingles and was treated.  Otherwise general health has been stable. She continues to be on Rebif.  Denies any side effects from the injections.  She reports that recently she missed 1 dose of Rebif.  Otherwise, she is consistently taking the injections.  She does not premedicate herself with any medications.  She rotates between 5 different sites for the injections. Balance issues improved with physical therapy. She is on ampyra. Denies any recent falls. Able to climb stairs. Not using any assistive devices. Leg spasticity improved and currently taking baclofen 10 mg twice daily. Denies any bladder or bowel issues.      PAST MEDICAL/SURGICAL HISTORY:  Cholecystectomy. Otherwise unremarkable.        FAMILY  HISTORY:  Negative for MS.        SOCIAL HISTORY:  She is .  She has 1 daughter from a previous marriage.  She denies smoking.  Mild alcohol use.  Denies any recreational drug use.  She exercises 45 minutes every day.      07/27/2016 MRI of brain:  Impression:  Greater than 20 foci of T2 hyperintensity within the cerebral white matter, cerebellar white matter and brainstem consistent with the clinical suspicion of demyelinating disease.  No evidence of active demyelination.  No change since 05/01/2014.      Vitamin D: Currently, she is taking 1000 international unit and her most recent level from 08/2016 was 76.      PHYSICAL EXAMINATION:   VITAL SIGNS:  Blood pressure 98/56, pulse 68, respirations 20, weight 49.4 kg or 108 pounds, 14.4 ounces.   MENTAL STATUS:  An alert, awake and oriented x4, pleasant female.   CRANIAL NERVES:  Visual fields are full to confrontation.  The pupils are equal, round and reactive to light and there is no Albino Jasper pupil funduscopic examination demonstrates no optic pallor, or papilledema or retinal hemorrhage.  Extraocular movements are intact with no internuclear ophthalmoplegia.  No nystagmus.  Facial strength and sensation are normal.  Hearing is normal.  Palate elevation and tongue protrusion are normal.   POWER:  Strength is 5/5 in all extremities except for left lower extremity with hip flexors 4+/5, knee extensors 5/5, knee flexors 5/5, dorsiflexors 4+/5 on the left.   SENSORY:  Intact to light touch throughout.   MOTOR/CEREBELLAR:  There are no tremors or myoclonus or other abnormal movements.  Tone is slightly increased in her left leg.  There is no appendicular ataxia on finger-to-nose testing.  Rapid alternating movements are slow in her left foot.  There is no pronator drift.   GAIT:  She has a wide-based, somewhat spastic gait.  Tandem walking moderately impaired.      ASSESSMENT AND PLAN:     1. Relapsing remitting MS, stable on Rebif.  Clinical exam overall  stable compared to the previous exam in 2017.  Most recent MRI from last year was stable without any new or active lesions.  She is compliant with Rebif and we will plan to get a CBC and a hepatic profile for Rebif monitoring.  She will  follow up with Dr. Mccall in 6 months and we will get an MRI brain in a year.       2. Please call us with questions or concerns.       I spent 50 minutes with this patient today, greater than 50% of which was spent in counseling and coordination of care.   RICHARD FOX, CNP       D: 2017 14:01   T: 2017 15:15   MT: tb      Name:     ALAN JOHNSON   MRN:      [id number]        Account:      [id number]   :      1960           Service Date: 2017      Document: [id number]

## 2017-08-18 NOTE — PROGRESS NOTES
[facility] OUTPATIENT MS CLINIC VISIT NOTE    REASON FOR VISIT:  Lydia is a 57-year-old female who returns to the clinic today for regularly scheduled followup of her diagnosis of relapsing-remitting multiple sclerosis.  She was most recently seen by Dr. Doan in 01/2017.      HISTORY OF PRESENT ILLNESS:  Please refer to previous clinic notes for detailed history.  In short, in 1987 patient had an episode of double vision.  She was seen by Ophthalmology.  She was diagnosed with MS after having an imaging study.  She also had a spinal tap.  She is unsure about the findings.  In 2005, her walking was affected.  She started seeing Dr. Vargas in 2006 and was started on Rebif. She continues to be on Rebif. In 01/2017, she had her most recent visit with Dr. Doan. She was referred to physical therapy for balance issues.      INTERVAL HISTORY SINCE LAST VISIT:  Overall Lydia is doing well.  Last month, she was diagnosed with shingles and was treated.  Otherwise general health has been stable. She continues to be on Rebif.  Denies any side effects from the injections.  She reports that recently she missed 1 dose of Rebif.  Otherwise, she is consistently taking the injections.  She does not premedicate herself with any medications.  She rotates between 5 different sites for the injections. Balance issues improved with physical therapy. She is on ampyra. Denies any recent falls. Able to climb stairs. Not using any assistive devices. Leg spasticity improved and currently taking baclofen 10 mg twice daily. Denies any bladder or bowel issues.      PAST MEDICAL/SURGICAL HISTORY:  Cholecystectomy. Otherwise unremarkable.        FAMILY HISTORY:  Negative for MS.        SOCIAL HISTORY:  She is .  She has 1 daughter from a previous marriage.  She denies smoking.  Mild alcohol use.  Denies any recreational drug use.  She exercises 45 minutes every day.      07/27/2016 MRI of brain:  Impression:  Greater than 20 foci  of T2 hyperintensity within the cerebral white matter, cerebellar white matter and brainstem consistent with the clinical suspicion of demyelinating disease.  No evidence of active demyelination.  No change since 05/01/2014.      Vitamin D: Currently, she is taking 1000 international unit and her most recent level from 08/2016 was 76.      PHYSICAL EXAMINATION:   VITAL SIGNS:  Blood pressure 98/56, pulse 68, respirations 20, weight 49.4 kg or 108 pounds, 14.4 ounces.   MENTAL STATUS:  An alert, awake and oriented x4, pleasant female.   CRANIAL NERVES:  Visual fields are full to confrontation.  The pupils are equal, round and reactive to light and there is no Albino Jasper pupil funduscopic examination demonstrates no optic pallor, or papilledema or retinal hemorrhage.  Extraocular movements are intact with no internuclear ophthalmoplegia.  No nystagmus.  Facial strength and sensation are normal.  Hearing is normal.  Palate elevation and tongue protrusion are normal.   POWER:  Strength is 5/5 in all extremities except for left lower extremity with hip flexors 4+/5, knee extensors 5/5, knee flexors 5/5, dorsiflexors 4+/5 on the left.   SENSORY:  Intact to light touch throughout.   MOTOR/CEREBELLAR:  There are no tremors or myoclonus or other abnormal movements.  Tone is slightly increased in her left leg.  There is no appendicular ataxia on finger-to-nose testing.  Rapid alternating movements are slow in her left foot.  There is no pronator drift.   GAIT:  She has a wide-based, somewhat spastic gait.  Tandem walking moderately impaired.      ASSESSMENT AND PLAN:     1. Relapsing remitting MS, stable on Rebif.  Clinical exam overall stable compared to the previous exam in 01/2017.  Most recent MRI from last year was stable without any new or active lesions.  She is compliant with Rebif and we will plan to get a CBC and a hepatic profile for Rebif monitoring.  She will  follow up with Dr. Mccall in 6 months and we will get  an MRI brain in a year.       2. Please call us with questions or concerns.       I spent 50 minutes with this patient today, greater than 50% of which was spent in counseling and coordination of care.       RICHARD FOX, CNP             D: 2017 14:01   T: 2017 15:15   MT: mally      Name:     ALAN JOHNSON   MRN:      [id number]        Account:      [id number]   :      1960           Service Date: 2017      Document: [id number]

## 2017-08-21 ENCOUNTER — TELEPHONE (OUTPATIENT)
Dept: NEUROLOGY | Facility: CLINIC | Age: 57
End: 2017-08-21

## 2017-08-21 NOTE — TELEPHONE ENCOUNTER
Prior Authorization Specialty Medication Request    Medication/Dose: Ampyra 10mg APPROVED  Diagnosis and ICD:Multiple Sclerosis and Neurologic Gait Dysfunction, G35  New/Renewal/Insurance Change PA: Insurance Change    Important Lab Values: n/a    Previously Tried and Failed Therapies:     Rationale: Patient has been stable on Ampyra for years; There is no other FDA approved medication to help increase walking speed and gait safety.    Would you like to include any research articles? n/a   If yes please include the hyperlink(s) below or fax @ [phone].    (Include Name and MRN)    If you received a fax notification from an outside Pharmacy;  Pharmacy Name:[facility]  Pharmacy #:  Pharmacy Fax:

## 2017-08-21 NOTE — TELEPHONE ENCOUNTER
Prior Authorization Approval    Authorization Effective Date: 8/17/2017  Authorization Expiration Date: 8/17/2018  Medication: Ampyra 10mg APPROVED  Approved Dose/Quantity: 60 per 30 days  Reference #: [id number]   Insurance Company: [employer] ([facility]) - Phone [phone] Fax [phone]  Expected CoPay:       CoPay Card Available:      Foundation Assistance Needed:    Which Pharmacy is filling the prescription (Not needed for infusion/clinic administered): [place] - [address] [address] [facility]  Pharmacy Notified: No  Patient Notified: No

## 2017-08-21 NOTE — TELEPHONE ENCOUNTER
PA Initiation    Medication: Ampyra 10mg APPROVED  Insurance Company: [employer] ([facility]) - Phone [phone] Fax [phone]  Pharmacy Filling the Rx: [facility] - [id number]  Filling Pharmacy Phone:    Filling Pharmacy Fax:    Start Date: 8/14/2017

## 2018-02-14 ENCOUNTER — APPOINTMENT (OUTPATIENT)
Dept: LAB | Facility: CLINIC | Age: 58
End: 2018-02-14
Payer: COMMERCIAL

## 2018-02-14 ENCOUNTER — OFFICE VISIT (OUTPATIENT)
Dept: NEUROLOGY | Facility: CLINIC | Age: 58
End: 2018-02-14
Attending: PSYCHIATRY & NEUROLOGY
Payer: COMMERCIAL

## 2018-02-14 VITALS
BODY MASS INDEX: 20.59 KG/M2 | HEART RATE: 73 BPM | HEIGHT: 63 IN | WEIGHT: 116.2 LBS | DIASTOLIC BLOOD PRESSURE: 66 MMHG | SYSTOLIC BLOOD PRESSURE: 116 MMHG

## 2018-02-14 DIAGNOSIS — G35 MULTIPLE SCLEROSIS (H): ICD-10-CM

## 2018-02-14 DIAGNOSIS — G35 MS (MULTIPLE SCLEROSIS) (H): Primary | ICD-10-CM

## 2018-02-14 LAB
ALBUMIN SERPL-MCNC: 3.6 G/DL (ref 3.4–5)
ALP SERPL-CCNC: 80 U/L (ref 40–150)
ALT SERPL W P-5'-P-CCNC: 26 U/L (ref 0–50)
ANION GAP SERPL CALCULATED.3IONS-SCNC: 6 MMOL/L (ref 3–14)
AST SERPL W P-5'-P-CCNC: 22 U/L (ref 0–45)
BASOPHILS # BLD AUTO: 0 10E9/L (ref 0–0.2)
BASOPHILS NFR BLD AUTO: 0.3 %
BILIRUB SERPL-MCNC: 0.3 MG/DL (ref 0.2–1.3)
BUN SERPL-MCNC: 16 MG/DL (ref 7–30)
CALCIUM SERPL-MCNC: 8.9 MG/DL (ref 8.5–10.1)
CHLORIDE SERPL-SCNC: 106 MMOL/L (ref 94–109)
CO2 SERPL-SCNC: 29 MMOL/L (ref 20–32)
CREAT SERPL-MCNC: 0.63 MG/DL (ref 0.52–1.04)
DEPRECATED CALCIDIOL+CALCIFEROL SERPL-MC: 55 UG/L (ref 20–75)
DIFFERENTIAL METHOD BLD: ABNORMAL
EOSINOPHIL # BLD AUTO: 0.1 10E9/L (ref 0–0.7)
EOSINOPHIL NFR BLD AUTO: 1.5 %
ERYTHROCYTE [DISTWIDTH] IN BLOOD BY AUTOMATED COUNT: 12.7 % (ref 10–15)
GFR SERPL CREATININE-BSD FRML MDRD: >90 ML/MIN/1.7M2
GLUCOSE SERPL-MCNC: 57 MG/DL (ref 70–99)
HCT VFR BLD AUTO: 36.6 % (ref 35–47)
HGB BLD-MCNC: 12.4 G/DL (ref 11.7–15.7)
IMM GRANULOCYTES # BLD: 0 10E9/L (ref 0–0.4)
IMM GRANULOCYTES NFR BLD: 0.3 %
LYMPHOCYTES # BLD AUTO: 1.1 10E9/L (ref 0.8–5.3)
LYMPHOCYTES NFR BLD AUTO: 27.4 %
MCH RBC QN AUTO: 31 PG (ref 26.5–33)
MCHC RBC AUTO-ENTMCNC: 33.9 G/DL (ref 31.5–36.5)
MCV RBC AUTO: 92 FL (ref 78–100)
MONOCYTES # BLD AUTO: 0.3 10E9/L (ref 0–1.3)
MONOCYTES NFR BLD AUTO: 8.5 %
NEUTROPHILS # BLD AUTO: 2.4 10E9/L (ref 1.6–8.3)
NEUTROPHILS NFR BLD AUTO: 62 %
NRBC # BLD AUTO: 0 10*3/UL
NRBC BLD AUTO-RTO: 0 /100
PLATELET # BLD AUTO: 158 10E9/L (ref 150–450)
POTASSIUM SERPL-SCNC: 4.1 MMOL/L (ref 3.4–5.3)
PROT SERPL-MCNC: 7.8 G/DL (ref 6.8–8.8)
RBC # BLD AUTO: 4 10E12/L (ref 3.8–5.2)
SODIUM SERPL-SCNC: 141 MMOL/L (ref 133–144)
TSH SERPL DL<=0.005 MIU/L-ACNC: 1.48 MU/L (ref 0.4–4)
VIT B12 SERPL-MCNC: 460 PG/ML (ref 193–986)
WBC # BLD AUTO: 3.9 10E9/L (ref 4–11)

## 2018-02-14 PROCEDURE — 83516 IMMUNOASSAY NONANTIBODY: CPT | Performed by: PSYCHIATRY & NEUROLOGY

## 2018-02-14 PROCEDURE — 82607 VITAMIN B-12: CPT | Performed by: PSYCHIATRY & NEUROLOGY

## 2018-02-14 PROCEDURE — 80053 COMPREHEN METABOLIC PANEL: CPT | Performed by: PSYCHIATRY & NEUROLOGY

## 2018-02-14 PROCEDURE — 36415 COLL VENOUS BLD VENIPUNCTURE: CPT | Performed by: PSYCHIATRY & NEUROLOGY

## 2018-02-14 PROCEDURE — 85025 COMPLETE CBC W/AUTO DIFF WBC: CPT | Performed by: PSYCHIATRY & NEUROLOGY

## 2018-02-14 PROCEDURE — 84207 ASSAY OF VITAMIN B-6: CPT | Performed by: PSYCHIATRY & NEUROLOGY

## 2018-02-14 PROCEDURE — 84443 ASSAY THYROID STIM HORMONE: CPT | Performed by: PSYCHIATRY & NEUROLOGY

## 2018-02-14 PROCEDURE — G0463 HOSPITAL OUTPT CLINIC VISIT: HCPCS | Mod: ZF

## 2018-02-14 PROCEDURE — 82306 VITAMIN D 25 HYDROXY: CPT | Performed by: PSYCHIATRY & NEUROLOGY

## 2018-02-14 RX ORDER — DALFAMPRIDINE 10 MG/1
10 TABLET, FILM COATED, EXTENDED RELEASE ORAL 2 TIMES DAILY
Qty: 180 TABLET | Refills: 3 | Status: SHIPPED | OUTPATIENT
Start: 2018-02-14 | End: 2019-03-11

## 2018-02-14 RX ORDER — BACLOFEN 10 MG/1
10 TABLET ORAL 3 TIMES DAILY
Qty: 270 TABLET | Refills: 3 | Status: SHIPPED | OUTPATIENT
Start: 2018-02-14 | End: 2019-03-18

## 2018-02-14 ASSESSMENT — PAIN SCALES - GENERAL: PAINLEVEL: NO PAIN (0)

## 2018-02-14 NOTE — PROGRESS NOTES
MULTIPLE SCLEROSIS CLINIC AT THE [facility]  FOLLOWUP/ESTABLISHED PATIENT VISIT      PRINCIPAL NEUROLOGIC DIAGNOSIS: Multiple Sclerosis    Date of Onset: 1987  Date of Diagnosis: 1987  Initial Clinical Course: Relapsing Remitting  Current Clinical Course: Relapsing Remitting  Past Disease Modifying Therapy(ies): NA  Current Disease Modifying Therapy(ies):Rebif  Most Recent MRI of the Brain: 7/27/16  Most Recent MRI of the Cervical Cord NA  Most Recent MRI of the Thoracic Cord: NA  Most Recent Lumbar Puncture: NA  Most Recent OCT: NA   Most Recent JCV:  NA  Most Recent Remote Hepatitis Panel:    Most Recent VZV IgG:  NA  Most Recent TB Quant:  NA      CHIEF COMPLAINT: Follow up on DMT    HPI:Please refer to previous clinic notes for detailed history.  In short, in 1987 patient had an episode of double vision.  She was seen by Ophthalmology.  She was diagnosed with MS after having an imaging study.  She also had a spinal tap.  She is unsure about the findings.  In 2005, her walking was affected.  She started seeing Dr. Vargas in 2006 and was started on Rebif. She continues to be on Rebif. In 01/2017, she had her most recent visit with Dr. Doan. She was referred to physical therapy for balance issues.    INTERVAL HISTORY:    Overall, she reports that her balance is still a problems. She denies having a falls. This is largely unchange/ Nothing appears to make her balance better or worse.    Issues with current MS therapy: Tolerating DMT without issue    REVIEW OF SYSTEMS:    Mood: unchanged  Spasticity:non-painful, unchanged and once every couple of months  Bladder: unchanged  Bowel: unchanged  Pain related to today's visit:reviewed on nursing intake documentation  Fatigue: unchanged  Sleep: better and unchanged  Memory/Concentration: unchanged, difficulty with word finding and difficulty with short term memory    PAST HISTORY was reviewed and updated:      MEDICATIONS and ALLERGIES were reviewed and  updated.    SOCIAL HISTORY was reviewed and updated:        EXAM:    PHYSICAL EXAMINATION:   VITAL SIGNS:  B/P: 116/66, T: Data Unavailable, P: 73, R: Data Unavailable    GENERAL: The patient is a well-nourished  who presents to the evaluation alone.  NEUROLOGIC:   MENTAL STATUS: Alert,awake and  oriented times four.   CRANIAL NERVES:  Visual fields are full to confrontation. The pupils are  round and react to light and there is no Albino Jasper pupil. Extraocular movements are  intact with no  internuclear ophthalmoplegia. Left gaze evoked nystagmus. Facial strength and sensation are  normal. Hearing is  normal. Palate elevation and tongue protrusion are  normal.   POWER:     Motor    Upper      Right Left   Shoulder Abduction 5 5   Elbow Flexion 5 5   Elbow Extension 5 4   Wrist Extension 5 5   Digit Extension 5 5   Digit Flexion 5 5   APB 5 5   Tone 1 1   Lower       Right Left   Hip Flexion 5 5   Knee Extension 5 5   Knee Flexion 5 5   Foot Dorsiflexion 5 5   Foot Plantar Flexion 5 5   EH 5 5   Toe Flexion 5 5   Tone 1 1           Grade Description   0 No increase in muscle tone   1 Slight increase in muscle tone, manifested by a catch and release or by minimal resistance at the end of the range of motion when the affected part(s) is moved in flexion or extension   1+ Slight increase in muscle tone, manifested by a catch, followed by minimal resistance throughout the remainder (less than half) of the ROM   2 More marked increase in muscle tone through most of the ROM, but affected part(s) easily moved   3 Considerable increase in muscle tone, passive movement difficult   4 Affected part(s) rigid in flexion or extension           SENSORY:     Light touch:  Intact in all extremities      MOTOR/CEREBELLAR:    Right Left   RRM 0 Normal 1 Abnormal   TRUPTI 1 Abnormal 1 Abnormal   FTN 1 Abnormal 1 Abnormal   RRM 0 Normal 0 Normal   HKS 0 Normal 0 Normal           GAIT: Gait is wide-based, spastic with circumduction of the  left leg, and steady and the patient is able to walk on heels and toes. She can not walk in tandem.    Romberg: Stable with eye(s) closed    RESULTS:  Monitoring labs:    Orders Only on 08/17/2017   Component Date Value Ref Range Status     WBC 08/17/2017 4.7  4.0 - 11.0 10e9/L Final     RBC Count 08/17/2017 4.05  3.8 - 5.2 10e12/L Final     Hemoglobin 08/17/2017 12.2  11.7 - 15.7 g/dL Final     Hematocrit 08/17/2017 37.5  35.0 - 47.0 % Final     MCV 08/17/2017 93  78 - 100 fl Final     MCH 08/17/2017 30.1  26.5 - 33.0 pg Final     MCHC 08/17/2017 32.5  31.5 - 36.5 g/dL Final     RDW 08/17/2017 13.2  10.0 - 15.0 % Final     Platelet Count 08/17/2017 174  150 - 450 10e9/L Final     Diff Method 08/17/2017 Automated Method   Final     % Neutrophils 08/17/2017 60.8  % Final     % Lymphocytes 08/17/2017 27.9  % Final     % Monocytes 08/17/2017 9.4  % Final     % Eosinophils 08/17/2017 1.5  % Final     % Basophils 08/17/2017 0.2  % Final     % Immature Granulocytes 08/17/2017 0.2  % Final     Nucleated RBCs 08/17/2017 0  0 /100 Final     Absolute Neutrophil 08/17/2017 2.8  1.6 - 8.3 10e9/L Final     Absolute Lymphocytes 08/17/2017 1.3  0.8 - 5.3 10e9/L Final     Absolute Monocytes 08/17/2017 0.4  0.0 - 1.3 10e9/L Final     Absolute Eosinophils 08/17/2017 0.1  0.0 - 0.7 10e9/L Final     Absolute Basophils 08/17/2017 0.0  0.0 - 0.2 10e9/L Final     Abs Immature Granulocytes 08/17/2017 0.0  0 - 0.4 10e9/L Final     Absolute Nucleated RBC 08/17/2017 0.0   Final     Bilirubin Direct 08/17/2017 0.1  0.0 - 0.2 mg/dL Final     Bilirubin Total 08/17/2017 0.3  0.2 - 1.3 mg/dL Final     Albumin 08/17/2017 3.6  3.4 - 5.0 g/dL Final     Protein Total 08/17/2017 7.8  6.8 - 8.8 g/dL Final     Alkaline Phosphatase 08/17/2017 91  40 - 150 U/L Final     ALT 08/17/2017 24  0 - 50 U/L Final     AST 08/17/2017 22  0 - 45 U/L Final   ]      MRI brain:    no new MRI to review    ASSESSMENT/PLAN:  The patient is a 57-year-old woman with a past  medical history of relapsing remitting multiple sclerosis who is presenting today as a follow-up and to establish care. Overall, the patient appears to be clinically stable On Rebif. She has not had an MRI in over a year, and is due for an MRI. Currently she plans on getting an MRI in 6 months when she follows up with Claudia. While this is suboptimal, it is reasonable given her overall stability. I will check blood work to ensure that Rebif is still a safe option for her. I will also continue her on this at this time. I also refilled her baclofen and Ampyra. I also spent a prolonged period of time explaining the risks and benefits of biotin. The patient expressed interest in trying biotin.  I recommended that that the patient take 10 mg of biotin for one month and then if she tolerates than she can go up to the 300 mg dose. I also instructed her of the importance of informing her other physicians that she is on this high dose if she goes on it. Assuming that she does well, I will follow her up in 6 months. I instructed the patient to call my office with any questions and concerns.     Check TSH, CBC, and CMP  Get MRI in 6 month with follow up  She will consider biotin.    I spent 25 minutes in this visit, with >50% direct patient time spent counseling about prognosis, treatment options, and coordination of care.    Eladio Mccall MD [facility]  Staff Neurologist   02/14/18

## 2018-02-14 NOTE — MR AVS SNAPSHOT
After Visit Summary   2/14/2018    Lydia William    MRN: [id number]           Patient Information     Date Of Birth          1960        Visit Information        Provider Department      2/14/2018 11:00 AM Eladio Mccall MD [facility]        Today's Diagnoses     MS (multiple sclerosis) (H)    -  1    Multiple sclerosis (H)          Care Instructions    Will get blood work    Consider trying biotin 10mg daily for 1 month. If you tolerate it then you can order the 300 mg biotin at [email or web address]/products/high-dose-biotin-capsules?[email or web address]    Will continue Rebif.    Will have you follow up in 6 months with a MRI    You saw a neurology provider today at the [facility].  You may have also met with the MS RN Care Coordinator.  In order to get a message to your MS Center provider, you should contact [phone] option [phone] for the triage nurse line.    You should contact us via this protocol if you have any of the following symptoms:    New or worsening neurologic symptoms that persist for 24-48 hours, such as:  o New onset of pain or marked worsening of pain  o Difficulty with speech, swallowing, or breathing  o New onset of vertigo or dizziness  o Change in bowel or bladder function (incontinence, difficulty urinating)    Increasing difficulty in self care    Marked changes in vision (double vision, blurred vision, graying of vision)    Change in mobility    Change in cognitive function    Falling    Worsening numbness, tingling or pain with a change in function    Worsening fatigue lasting more than 2 weeks  If you had labs completed today, we will contact you with the results.  If you are active in [employer], they will be released to you there.  Otherwise, your results will be provided to you via mail or telephone call.  Some results take up to 2 weeks for completion.  If you haven t heard anything about your lab  results within 2 weeks, you can call or send a [employer] message to obtain your results.  If you have an MRI scheduled in the week or two prior to your next appointment, we will go over the results at your scheduled follow up appointment.  If you are not scheduled to see your MS Center provider within about 2 weeks after your MRI, please call or send a [employer] message to obtain your results if you haven t heard anything within 2 weeks.  Please be aware that it takes at least 5 business days after routine MRIs for your results to be reviewed by both the radiologist and your doctor.  MRIs completed at facilities outside of the [place] system take about 2 weeks in order for the MRI disc to be mailed to our clinic and uploaded into your medical record.    Prescription refills should be faxed to us by your pharmacy.  Our fax number for prescription refills is [phone].  Please do your best to come to your appointments, and to arrive 15 minutes early to allow time for checking in.  [facility] reserves the right to terminate care of established patients if a patient misses three or more appointments in a clinic without providing notification within a 12-month period.    Developing Your Care Team  Individuals living with chronic illnesses like MS may be unaware that they are at risk for the same range of medical problems as everyone else.  This is why you must establish a relationship with other health care providers in addition to your Multiple Sclerosis doctor.  It can be difficult at times to figure out whether a health concern is related to your MS, or whether it is related to something else, such as hormonal changes, pseduoexacerbations, changes in your core body temperature, flu-like reactions to interferons, exercise, or infections.  Urinary tract infections (UTIs) are common culprits that can cause fatigue, weakness, or other  MS attack -like symptoms without classic symptoms of a UTI.   For this reason, if you call or come in to discuss symptoms, you may be asked to get in touch with your primary provider or another specialist, so that you receive the comprehensive care you need.  What is Multiple Sclerosis (MS)?  MS is a disease in which the nerve tissues in the brain and/or spinal cord are attacked by immune cells in the body.  These immune cells are present in everyone, and their normal role is to fight off infections.  In people with MS, these cells change the way they function and cross into the nervous system.  Once there, they cause inflammation that damages the myelin (or the protective coating of a nerve cell, much like the plastic covering on an electrical cord) and parts of the nerve cell itself.  So far, a clear cause for this immune system dysfunction has not been found.  MS often starts out as the  relapsing-remitting  form.  This means there are episodes when you have symptoms, and other times when you recover to normal or near-normal.  Over time, if the damage to the nervous system continues, the disease can cause additional disability, such as difficulty walking.  If the relapses and nerve damage can be prevented with available medications, many patients with MS can go many years between relapses and have relatively little disability.  Remember: MS is a condition that changes and must be evaluated on an ongoing basis!  What is a Relapse? (Also called flare-ups, attacks, or exacerbations)  Relapses are due to the occurrence of inflammation in some part of the brain and/or spinal cord.  A relapse is new or recurrent symptoms which persist for at least 24 hours and sometimes worsen over 48 hours.  New symptoms need to be  by at least 1 month in order to be considered separate relapses. Most of the time, symptoms reach their maximal intensity within 2 weeks and then begin to slowly resolve.  At times, your symptoms may not recover fully for up to 6 months, depending on the  severity of the episode.  The frequency of relapses is generally higher early in the disease, but can vary greatly among individuals with MS.  Improvement of symptoms for an individual is unpredictable with each relapse.    It is important to remember that an increase in symptoms and changes in function may not necessarily be a relapse.  There are other factors that contribute to such changes, such as hot weather, increased body temperature, infection/illness, stress and sleep deprivation.  The worsening of symptoms may feel like a relapse when in reality it is not.  These episodes are referred to as pseudorelapses.  Once the underlying cause is addressed, symptoms usually fade away and you feel better.  If you experience a worsening of symptoms that lasts more than 48 hours and does not improve with cooling down, decreasing stress, or treatment of an infection, please call us and we can help to better determine whether you are having a pseudorelapse versus a relapse.                Follow-ups after your visit        Future tests that were ordered for you today     Open Future Orders        Priority Expected Expires Ordered    MR Brain w/o Contrast Routine  2/14/2019 2/14/2018            Who to contact     If you have questions or need follow up information about today's clinic visit or your schedule please contact [facility] directly at [phone].  Normal or non-critical lab and imaging results will be communicated to you by MyChart, letter or phone within 4 business days after the clinic has received the results. If you do not hear from us within 7 days, please contact the clinic through MyChart or phone. If you have a critical or abnormal lab result, we will notify you by phone as soon as possible.  Submit refill requests through [employer] or call your pharmacy and they will forward the refill request to us. Please allow 3 business days for your refill to be completed.          Additional  "Information About Your Visit        MyChart Information     [employer] gives you secure access to your electronic health record. If you see a primary care provider, you can also send messages to your care team and make appointments. If you have questions, please call your primary care clinic.  If you do not have a primary care provider, please call [phone] and they will assist you.        Care EveryWhere ID     This is your Care EveryWhere ID. This could be used by other organizations to access your [facility] medical records  [id number]        Your Vitals Were     Pulse Height BMI (Body Mass Index)             73 1.6 m (5' 3\") 20.58 kg/m2          Blood Pressure from Last 3 Encounters:   02/14/18 116/66   08/17/17 98/56   01/24/17 111/66    Weight from Last 3 Encounters:   02/14/18 52.7 kg (116 lb 3.2 oz)   08/17/17 49.4 kg (108 lb 14.4 oz)   01/24/17 49.2 kg (108 lb 6.4 oz)              We Performed the Following     CBC with platelets differential     Comprehensive metabolic panel     Tissue transglutaminase oliverio IgA and IgG     TSH with free T4 reflex     Vitamin B12     Vitamin B6     Vitamin D Deficiency          Where to get your medicines      These medications were sent to [facility] [address] [place] [address] 42  AT [address] 42 [facility] [address]     Phone:  [phone]     baclofen 10 MG tablet         Call your pharmacy to confirm that your medication is ready for pickup. It may take up to 24 hours for them to receive the prescription. If the prescription is not ready within 3 business days, please contact your clinic or your provider.     We will let you know when these medications are ready. If you don't hear back within 3 business days, please contact us.     Dalfampridine 10 MG Tb12    interferon beta-1a 44 MCG/0.5ML injection          Primary Care Provider Office Phone # Fax #    Garry Echavarria [phone]    "    [facility] [address] [place] [address]        Equal Access to Services     MARCIEERICA DADA : Hadii aad ku hadmonetrobbie Solucio, waaxda luqadaha, qaybta kaalmada kelly, raisa leadanysissy heard. So [facility] [phone].    ATENCIÓN: Si habla español, tiene a bolivar disposición servicios gratuitos de asistencia lingüística. Llame al [phone].    We comply with applicable federal civil rights laws and [place] laws. We do not discriminate on the basis of race, color, national origin, age, disability, sex, sexual orientation, or gender identity.            Thank you!     Thank you for choosing [facility]  for your care. Our goal is always to provide you with excellent care. Hearing back from our patients is one way we can continue to improve our services. Please take a few minutes to complete the written survey that you may receive in the mail after your visit with us. Thank you!             Your Updated Medication List - Protect others around you: Learn how to safely use, store and throw away your medicines at www.disposemymeds.org.          This list is accurate as of 2/14/18 11:27 AM.  Always use your most recent med list.                   Brand Name Dispense Instructions for use Diagnosis    baclofen 10 MG tablet    LIORESAL    270 tablet    Take 1 tablet (10 mg) by mouth 3 times daily    Multiple sclerosis (H)       Dalfampridine 10 MG Tb12     180 tablet    Take 1 tablet (10 mg) by mouth 2 times daily    Multiple sclerosis (H)       ibuprofen 200 MG tablet    ADVIL/MOTRIN     Take 1-3 tablets by mouth daily as needed.        interferon beta-1a 44 MCG/0.5ML injection    REBIF    12 Syringe    Inject 0.5 mLs (44 mcg) Subcutaneous three times a week    Multiple sclerosis (H)       MULTIVITAMIN WOMEN Tabs      Take 1 tablet by mouth daily        Vitamin D3 400 UNITS Caps      Take 1,000 mg by mouth daily

## 2018-02-14 NOTE — PATIENT INSTRUCTIONS
Will get blood work    Consider trying biotin 10mg daily for 1 month. If you tolerate it then you can order the 300 mg biotin at [email or web address]/products/high-dose-biotin-capsules?[email or web address]    Will continue Rebif.    Will have you follow up in 6 months with a MRI    You saw a neurology provider today at the [facility].  You may have also met with the MS RN Care Coordinator.  In order to get a message to your MS Center provider, you should contact [phone] option [phone] for the triage nurse line.    You should contact us via this protocol if you have any of the following symptoms:    New or worsening neurologic symptoms that persist for 24-48 hours, such as:  o New onset of pain or marked worsening of pain  o Difficulty with speech, swallowing, or breathing  o New onset of vertigo or dizziness  o Change in bowel or bladder function (incontinence, difficulty urinating)    Increasing difficulty in self care    Marked changes in vision (double vision, blurred vision, graying of vision)    Change in mobility    Change in cognitive function    Falling    Worsening numbness, tingling or pain with a change in function    Worsening fatigue lasting more than 2 weeks  If you had labs completed today, we will contact you with the results.  If you are active in [employer], they will be released to you there.  Otherwise, your results will be provided to you via mail or telephone call.  Some results take up to 2 weeks for completion.  If you haven t heard anything about your lab results within 2 weeks, you can call or send a [employer] message to obtain your results.  If you have an MRI scheduled in the week or two prior to your next appointment, we will go over the results at your scheduled follow up appointment.  If you are not scheduled to see your MS Center provider within about 2 weeks after your MRI, please call or send a [employer] message to obtain your results if  you haven t heard anything within 2 weeks.  Please be aware that it takes at least 5 business days after routine MRIs for your results to be reviewed by both the radiologist and your doctor.  MRIs completed at facilities outside of the [place] system take about 2 weeks in order for the MRI disc to be mailed to our clinic and uploaded into your medical record.    Prescription refills should be faxed to us by your pharmacy.  Our fax number for prescription refills is [phone].  Please do your best to come to your appointments, and to arrive 15 minutes early to allow time for checking in.  [facility] reserves the right to terminate care of established patients if a patient misses three or more appointments in a clinic without providing notification within a 12-month period.    Developing Your Care Team  Individuals living with chronic illnesses like MS may be unaware that they are at risk for the same range of medical problems as everyone else.  This is why you must establish a relationship with other health care providers in addition to your Multiple Sclerosis doctor.  It can be difficult at times to figure out whether a health concern is related to your MS, or whether it is related to something else, such as hormonal changes, pseduoexacerbations, changes in your core body temperature, flu-like reactions to interferons, exercise, or infections.  Urinary tract infections (UTIs) are common culprits that can cause fatigue, weakness, or other  MS attack -like symptoms without classic symptoms of a UTI.  For this reason, if you call or come in to discuss symptoms, you may be asked to get in touch with your primary provider or another specialist, so that you receive the comprehensive care you need.  What is Multiple Sclerosis (MS)?  MS is a disease in which the nerve tissues in the brain and/or spinal cord are attacked by immune cells in the body.  These immune cells are present in everyone, and  their normal role is to fight off infections.  In people with MS, these cells change the way they function and cross into the nervous system.  Once there, they cause inflammation that damages the myelin (or the protective coating of a nerve cell, much like the plastic covering on an electrical cord) and parts of the nerve cell itself.  So far, a clear cause for this immune system dysfunction has not been found.  MS often starts out as the  relapsing-remitting  form.  This means there are episodes when you have symptoms, and other times when you recover to normal or near-normal.  Over time, if the damage to the nervous system continues, the disease can cause additional disability, such as difficulty walking.  If the relapses and nerve damage can be prevented with available medications, many patients with MS can go many years between relapses and have relatively little disability.  Remember: MS is a condition that changes and must be evaluated on an ongoing basis!  What is a Relapse? (Also called flare-ups, attacks, or exacerbations)  Relapses are due to the occurrence of inflammation in some part of the brain and/or spinal cord.  A relapse is new or recurrent symptoms which persist for at least 24 hours and sometimes worsen over 48 hours.  New symptoms need to be  by at least 1 month in order to be considered separate relapses. Most of the time, symptoms reach their maximal intensity within 2 weeks and then begin to slowly resolve.  At times, your symptoms may not recover fully for up to 6 months, depending on the severity of the episode.  The frequency of relapses is generally higher early in the disease, but can vary greatly among individuals with MS.  Improvement of symptoms for an individual is unpredictable with each relapse.    It is important to remember that an increase in symptoms and changes in function may not necessarily be a relapse.  There are other factors that contribute to such changes, such  as hot weather, increased body temperature, infection/illness, stress and sleep deprivation.  The worsening of symptoms may feel like a relapse when in reality it is not.  These episodes are referred to as pseudorelapses.  Once the underlying cause is addressed, symptoms usually fade away and you feel better.  If you experience a worsening of symptoms that lasts more than 48 hours and does not improve with cooling down, decreasing stress, or treatment of an infection, please call us and we can help to better determine whether you are having a pseudorelapse versus a relapse.

## 2018-02-14 NOTE — LETTER
2/14/2018     RE: Lydia William  [address] [place] [address]     Dear Colleague,    Thank you for referring your patient, Lydia William, to the [facility] MULTIPLE SCLEROSIS at [facility]. Please see a copy of my visit note below.    MULTIPLE SCLEROSIS CLINIC AT THE [facility]  FOLLOWUP/ESTABLISHED PATIENT VISIT      PRINCIPAL NEUROLOGIC DIAGNOSIS: Multiple Sclerosis    Date of Onset: 1987  Date of Diagnosis: 1987  Initial Clinical Course: Relapsing Remitting  Current Clinical Course: Relapsing Remitting  Past Disease Modifying Therapy(ies): NA  Current Disease Modifying Therapy(ies):Rebif  Most Recent MRI of the Brain: 7/27/16  Most Recent MRI of the Cervical Cord NA  Most Recent MRI of the Thoracic Cord: NA  Most Recent Lumbar Puncture: NA  Most Recent OCT: NA   Most Recent JCV:  NA  Most Recent Remote Hepatitis Panel:    Most Recent VZV IgG:  NA  Most Recent TB Quant:  NA      CHIEF COMPLAINT: Follow up on DMT    HPI:Please refer to previous clinic notes for detailed history.  In short, in 1987 patient had an episode of double vision.  She was seen by Ophthalmology.  She was diagnosed with MS after having an imaging study.  She also had a spinal tap.  She is unsure about the findings.  In 2005, her walking was affected.  She started seeing Dr. Vargas in 2006 and was started on Rebif. She continues to be on Rebif. In 01/2017, she had her most recent visit with Dr. Doan. She was referred to physical therapy for balance issues.    INTERVAL HISTORY:    Overall, she reports that her balance is still a problems. She denies having a falls. This is largely unchange/ Nothing appears to make her balance better or worse.    Issues with current MS therapy: Tolerating DMT without issue    REVIEW OF SYSTEMS:    Mood: unchanged  Spasticity:non-painful, unchanged and once every couple of months  Bladder: unchanged  Bowel: unchanged  Pain related to today's  visit:reviewed on nursing intake documentation  Fatigue: unchanged  Sleep: better and unchanged  Memory/Concentration: unchanged, difficulty with word finding and difficulty with short term memory    PAST HISTORY was reviewed and updated:      MEDICATIONS and ALLERGIES were reviewed and updated.    SOCIAL HISTORY was reviewed and updated:        EXAM:    PHYSICAL EXAMINATION:   VITAL SIGNS:  B/P: 116/66, T: Data Unavailable, P: 73, R: Data Unavailable    GENERAL: The patient is a well-nourished  who presents to the evaluation alone.  NEUROLOGIC:   MENTAL STATUS: Alert,awake and  oriented times four.   CRANIAL NERVES:  Visual fields are full to confrontation. The pupils are  round and react to light and there is no Albino Jasper pupil. Extraocular movements are  intact with no  internuclear ophthalmoplegia. Left gaze evoked nystagmus. Facial strength and sensation are  normal. Hearing is  normal. Palate elevation and tongue protrusion are  normal.   POWER:     Motor    Upper      Right Left   Shoulder Abduction 5 5   Elbow Flexion 5 5   Elbow Extension 5 4   Wrist Extension 5 5   Digit Extension 5 5   Digit Flexion 5 5   APB 5 5   Tone 1 1   Lower       Right Left   Hip Flexion 5 5   Knee Extension 5 5   Knee Flexion 5 5   Foot Dorsiflexion 5 5   Foot Plantar Flexion 5 5   EH 5 5   Toe Flexion 5 5   Tone 1 1           Grade Description   0 No increase in muscle tone   1 Slight increase in muscle tone, manifested by a catch and release or by minimal resistance at the end of the range of motion when the affected part(s) is moved in flexion or extension   1+ Slight increase in muscle tone, manifested by a catch, followed by minimal resistance throughout the remainder (less than half) of the ROM   2 More marked increase in muscle tone through most of the ROM, but affected part(s) easily moved   3 Considerable increase in muscle tone, passive movement difficult   4 Affected part(s) rigid in flexion or extension            SENSORY:     Light touch:  Intact in all extremities      MOTOR/CEREBELLAR:    Right Left   RRM 0 Normal 1 Abnormal   TRUPTI 1 Abnormal 1 Abnormal   FTN 1 Abnormal 1 Abnormal   RRM 0 Normal 0 Normal   HKS 0 Normal 0 Normal           GAIT: Gait is wide-based, spastic with circumduction of the left leg, and steady and the patient is able to walk on heels and toes. She can not walk in tandem.    Romberg: Stable with eye(s) closed    RESULTS:  Monitoring labs:    Orders Only on 08/17/2017   Component Date Value Ref Range Status     WBC 08/17/2017 4.7  4.0 - 11.0 10e9/L Final     RBC Count 08/17/2017 4.05  3.8 - 5.2 10e12/L Final     Hemoglobin 08/17/2017 12.2  11.7 - 15.7 g/dL Final     Hematocrit 08/17/2017 37.5  35.0 - 47.0 % Final     MCV 08/17/2017 93  78 - 100 fl Final     MCH 08/17/2017 30.1  26.5 - 33.0 pg Final     MCHC 08/17/2017 32.5  31.5 - 36.5 g/dL Final     RDW 08/17/2017 13.2  10.0 - 15.0 % Final     Platelet Count 08/17/2017 174  150 - 450 10e9/L Final     Diff Method 08/17/2017 Automated Method   Final     % Neutrophils 08/17/2017 60.8  % Final     % Lymphocytes 08/17/2017 27.9  % Final     % Monocytes 08/17/2017 9.4  % Final     % Eosinophils 08/17/2017 1.5  % Final     % Basophils 08/17/2017 0.2  % Final     % Immature Granulocytes 08/17/2017 0.2  % Final     Nucleated RBCs 08/17/2017 0  0 /100 Final     Absolute Neutrophil 08/17/2017 2.8  1.6 - 8.3 10e9/L Final     Absolute Lymphocytes 08/17/2017 1.3  0.8 - 5.3 10e9/L Final     Absolute Monocytes 08/17/2017 0.4  0.0 - 1.3 10e9/L Final     Absolute Eosinophils 08/17/2017 0.1  0.0 - 0.7 10e9/L Final     Absolute Basophils 08/17/2017 0.0  0.0 - 0.2 10e9/L Final     Abs Immature Granulocytes 08/17/2017 0.0  0 - 0.4 10e9/L Final     Absolute Nucleated RBC 08/17/2017 0.0   Final     Bilirubin Direct 08/17/2017 0.1  0.0 - 0.2 mg/dL Final     Bilirubin Total 08/17/2017 0.3  0.2 - 1.3 mg/dL Final     Albumin 08/17/2017 3.6  3.4 - 5.0 g/dL Final      Protein Total 08/17/2017 7.8  6.8 - 8.8 g/dL Final     Alkaline Phosphatase 08/17/2017 91  40 - 150 U/L Final     ALT 08/17/2017 24  0 - 50 U/L Final     AST 08/17/2017 22  0 - 45 U/L Final   ]      MRI brain:    no new MRI to review    ASSESSMENT/PLAN:  The patient is a 57-year-old woman with a past medical history of relapsing remitting multiple sclerosis who is presenting today as a follow-up and to establish care. Overall, the patient appears to be clinically stable On Rebif. She has not had an MRI in over a year, and is due for an MRI. Currently she plans on getting an MRI in 6 months when she follows up with Claudia. While this is suboptimal, it is reasonable given her overall stability. I will check blood work to ensure that Rebif is still a safe option for her. I will also continue her on this at this time. I also refilled her baclofen and Ampyra. I also spent a prolonged period of time explaining the risks and benefits of biotin. The patient expressed interest in trying biotin.  I recommended that that the patient take 10 mg of biotin for one month and then if she tolerates than she can go up to the 300 mg dose. I also instructed her of the importance of informing her other physicians that she is on this high dose if she goes on it. Assuming that she does well, I will follow her up in 6 months. I instructed the patient to call my office with any questions and concerns.     Check TSH, CBC, and CMP  Get MRI in 6 month with follow up  She will consider biotin.    I spent 25 minutes in this visit, with >50% direct patient time spent counseling about prognosis, treatment options, and coordination of care.    Eladio Mccall MD [facility]  Staff Neurologist   02/14/18

## 2018-02-14 NOTE — NURSING NOTE
"Chief Complaint   Patient presents with     Consult     NMS       Initial /66  Pulse 73  Ht 1.6 m (5' 3\")  Wt 52.7 kg (116 lb 3.2 oz)  BMI 20.58 kg/m2 Estimated body mass index is 20.58 kg/(m^2) as calculated from the following:    Height as of this encounter: 1.6 m (5' 3\").    Weight as of this encounter: 52.7 kg (116 lb 3.2 oz).  Medication Reconciliation: complete  Nicole MCCORMICK  "

## 2018-02-15 LAB
TTG IGA SER-ACNC: 1 U/ML
TTG IGG SER-ACNC: 1 U/ML

## 2018-02-17 LAB — VIT B6 SERPL-MCNC: 154 NMOL/L (ref 20–125)

## 2018-07-14 DIAGNOSIS — G35 MULTIPLE SCLEROSIS (H): ICD-10-CM

## 2018-07-16 RX ORDER — INTERFERON BETA-1A 44 UG/.5ML
INJECTION, SOLUTION SUBCUTANEOUS
Refills: 0 | OUTPATIENT
Start: 2018-07-16

## 2018-07-31 ENCOUNTER — TELEPHONE (OUTPATIENT)
Dept: NURSING | Facility: CLINIC | Age: 58
End: 2018-07-31

## 2018-08-01 NOTE — TELEPHONE ENCOUNTER
Paged that Ms. William had a medication question. She reports that she took a dose of her dalfampridine a little early. She take this q12h and took her morning dose around 8-830 and took her evening dose around 1800. I advised her to continue on her normal schedule starting with her morning dose tomorrow.    John Silva MD on 7/31/2018 at 7:36 PM

## 2018-08-15 LAB — PAP SMEAR - HIM PATIENT REPORTED: NEGATIVE

## 2018-08-16 ENCOUNTER — TELEPHONE (OUTPATIENT)
Dept: NEUROLOGY | Facility: CLINIC | Age: 58
End: 2018-08-16

## 2018-08-16 NOTE — TELEPHONE ENCOUNTER
Prior Authorization Approval    Authorization Effective Date: 8/16/2018  Authorization Expiration Date: 8/16/2019  Medication: Ampyra 10mg approved   Approved Dose/Quantity:   Reference #:     Insurance Company: Christine ([facility]) - Phone [phone] Fax [phone]  Expected CoPay:       CoPay Card Available:      Foundation Assistance Needed:    Which Pharmacy is filling the prescription (Not needed for infusion/clinic administered): [place] - [id number] [address] [facility]  Pharmacy Notified: No  Patient Notified: No

## 2018-08-16 NOTE — TELEPHONE ENCOUNTER
Prior Authorization Approval    Authorization Effective Date: 8/16/2018  Authorization Expiration Date: 8/16/2019  Medication: Rebif 44mcg APPROVED   Approved Dose/Quantity:   Reference #:     Insurance Company: Christine ([facility]) - Phone [phone] Fax [phone]  Expected CoPay:       CoPay Card Available:      Foundation Assistance Needed:    Which Pharmacy is filling the prescription (Not needed for infusion/clinic administered): [place] - [id number] [address] [facility]  Pharmacy Notified: No  Patient Notified: No

## 2018-08-16 NOTE — TELEPHONE ENCOUNTER
Prior Authorization Specialty Medication Request    Medication/Dose: Ampyra 10mg  ICD code (if different than what is on RX):  Multiple sclerosis and Neurologic Gait Dysfunction, G35  Previously Tried and Failed:  n/a    Important Lab Values: n/a  Rationale: Patient's symptoms have been controlled on this medication; There is no other FDA approved medication to help with gait safety and increase walking speed, please approve.    Insurance Name: [employer]  Insurance ID: [id number]  Insurance Phone Number: [phone]    Pharmacy Information (if different than what is on RX)  Name:  n/a  Phone:  n/a

## 2018-08-16 NOTE — TELEPHONE ENCOUNTER
Prior Authorization Specialty Medication Request    Medication/Dose: Rebif 44mcg  ICD code (if different than what is on RX):  Relapsing Remitting Multiple Sclerosis, G35  Previously Tried and Failed:  None    Important Lab Values: n/a  Rationale:  Continuation of current disease modifying therapy for demyelinating disease, currently clinically stable on, please approve.    Insurance Name: [employer]  Insurance ID: [id number]  Insurance Phone Number: [phone]    Pharmacy Information (if different than what is on RX)  Name:  n/a  Phone:  n/a

## 2018-08-16 NOTE — TELEPHONE ENCOUNTER
PA Initiation    Medication: Ampyra 10mg  Insurance Company: [employer] ([facility]) - Phone [phone] Fax [phone]  Pharmacy Filling the Rx: [place] - [id number] [address] [facility]  Filling Pharmacy Phone: [phone]  Filling Pharmacy Fax:    Start Date: 8/16/2018    THIS HAS BEEN SUBMITTED BY THE PRIOR-AUTHORIZATION TEAM. ANY QUESTIONS PLEASE CALL [phone]. THANK YOU

## 2018-08-16 NOTE — TELEPHONE ENCOUNTER
PA Initiation    Medication: Rebif 44g  Insurance Company: [employer] ([facility]) - Phone [phone] Fax [phone]  Pharmacy Filling the Rx: [facility] [place] - [id number] [address] [facility]  Filling Pharmacy Phone: [phone]  Filling Pharmacy Fax:    Start Date: 8/16/2018    THIS HAS BEEN SUBMITTED BY THE PRIOR-AUTHORIZATION TEAM. ANY QUESTIONS PLEASE CALL [phone]. THANK YOU

## 2018-08-19 DIAGNOSIS — G35 MULTIPLE SCLEROSIS (H): ICD-10-CM

## 2018-08-20 RX ORDER — DALFAMPRIDINE 10 MG/1
TABLET, FILM COATED, EXTENDED RELEASE ORAL
Qty: 60 TABLET | Refills: 0 | OUTPATIENT
Start: 2018-08-20

## 2018-08-28 ENCOUNTER — PATIENT OUTREACH (OUTPATIENT)
Dept: CARE COORDINATION | Facility: CLINIC | Age: 58
End: 2018-08-28

## 2018-08-30 ENCOUNTER — OFFICE VISIT (OUTPATIENT)
Dept: NEUROLOGY | Facility: CLINIC | Age: 58
End: 2018-08-30
Attending: NURSE PRACTITIONER
Payer: COMMERCIAL

## 2018-08-30 ENCOUNTER — RADIANT APPOINTMENT (OUTPATIENT)
Dept: MRI IMAGING | Facility: CLINIC | Age: 58
End: 2018-08-30
Attending: PSYCHIATRY & NEUROLOGY
Payer: COMMERCIAL

## 2018-08-30 VITALS
WEIGHT: 110.1 LBS | DIASTOLIC BLOOD PRESSURE: 58 MMHG | SYSTOLIC BLOOD PRESSURE: 105 MMHG | RESPIRATION RATE: 20 BRPM | OXYGEN SATURATION: 100 % | HEIGHT: 63 IN | BODY MASS INDEX: 19.51 KG/M2 | HEART RATE: 65 BPM

## 2018-08-30 DIAGNOSIS — G35 MULTIPLE SCLEROSIS (H): Primary | ICD-10-CM

## 2018-08-30 DIAGNOSIS — G35 MULTIPLE SCLEROSIS (H): ICD-10-CM

## 2018-08-30 DIAGNOSIS — G35 MS (MULTIPLE SCLEROSIS) (H): ICD-10-CM

## 2018-08-30 LAB
ALBUMIN SERPL-MCNC: 3.8 G/DL (ref 3.4–5)
ALP SERPL-CCNC: 84 U/L (ref 40–150)
ALT SERPL W P-5'-P-CCNC: 27 U/L (ref 0–50)
AST SERPL W P-5'-P-CCNC: 24 U/L (ref 0–45)
BASOPHILS # BLD AUTO: 0 10E9/L (ref 0–0.2)
BASOPHILS NFR BLD AUTO: 0.2 %
BILIRUB DIRECT SERPL-MCNC: 0.1 MG/DL (ref 0–0.2)
BILIRUB SERPL-MCNC: 0.5 MG/DL (ref 0.2–1.3)
DIFFERENTIAL METHOD BLD: NORMAL
EOSINOPHIL # BLD AUTO: 0 10E9/L (ref 0–0.7)
EOSINOPHIL NFR BLD AUTO: 0.8 %
ERYTHROCYTE [DISTWIDTH] IN BLOOD BY AUTOMATED COUNT: 12.7 % (ref 10–15)
HCT VFR BLD AUTO: 38.1 % (ref 35–47)
HGB BLD-MCNC: 12.8 G/DL (ref 11.7–15.7)
IMM GRANULOCYTES # BLD: 0 10E9/L (ref 0–0.4)
IMM GRANULOCYTES NFR BLD: 0.2 %
LYMPHOCYTES # BLD AUTO: 1.3 10E9/L (ref 0.8–5.3)
LYMPHOCYTES NFR BLD AUTO: 26.4 %
MCH RBC QN AUTO: 30.3 PG (ref 26.5–33)
MCHC RBC AUTO-ENTMCNC: 33.6 G/DL (ref 31.5–36.5)
MCV RBC AUTO: 90 FL (ref 78–100)
MONOCYTES # BLD AUTO: 0.4 10E9/L (ref 0–1.3)
MONOCYTES NFR BLD AUTO: 8.2 %
NEUTROPHILS # BLD AUTO: 3 10E9/L (ref 1.6–8.3)
NEUTROPHILS NFR BLD AUTO: 64.2 %
NRBC # BLD AUTO: 0 10*3/UL
NRBC BLD AUTO-RTO: 0 /100
PLATELET # BLD AUTO: 176 10E9/L (ref 150–450)
PROT SERPL-MCNC: 7.9 G/DL (ref 6.8–8.8)
RBC # BLD AUTO: 4.22 10E12/L (ref 3.8–5.2)
WBC # BLD AUTO: 4.7 10E9/L (ref 4–11)

## 2018-08-30 PROCEDURE — 85025 COMPLETE CBC W/AUTO DIFF WBC: CPT | Performed by: NURSE PRACTITIONER

## 2018-08-30 PROCEDURE — 80076 HEPATIC FUNCTION PANEL: CPT | Performed by: NURSE PRACTITIONER

## 2018-08-30 PROCEDURE — G0463 HOSPITAL OUTPT CLINIC VISIT: HCPCS

## 2018-08-30 PROCEDURE — 36415 COLL VENOUS BLD VENIPUNCTURE: CPT | Performed by: NURSE PRACTITIONER

## 2018-08-30 ASSESSMENT — PAIN SCALES - GENERAL: PAINLEVEL: NO PAIN (0)

## 2018-08-30 NOTE — MR AVS SNAPSHOT
After Visit Summary   8/30/2018    Lydia William    MRN: [id number]           Patient Information     Date Of Birth          1960        Visit Information        Provider Department      8/30/2018 12:00 PM Claudia Larsen APRN CNP [facility] Multiple Sclerosis        Today's Diagnoses     Multiple sclerosis (H)    -  1      Care Instructions    1. Continue Rebif.    2. Labs today.     3. Increase vit D to 4000 international units daily.    4. F/u with Dr. Mccall in 6 months.           Follow-ups after your visit        Follow-up notes from your care team     Return in about 6 months (around 2/28/2019).      Your next 10 appointments already scheduled     Mar 05, 2019  1:30 PM CST   (Arrive by 1:15 PM)   Return Multiple Sclerosis with Eladio Mccall MD   [facility] Multiple Sclerosis ([facility] [place])    [address] [place] [id number] [phone]              Future tests that were ordered for you today     Open Future Orders        Priority Expected Expires Ordered    CBC with platelets differential Routine  8/30/2019 8/30/2018    Hepatic panel Routine  8/30/2019 8/30/2018            Who to contact     If you have questions or need follow up information about today's clinic visit or your schedule please contact [facility] MULTIPLE SCLEROSIS directly at [phone].  Normal or non-critical lab and imaging results will be communicated to you by MyChart, letter or phone within 4 business days after the clinic has received the results. If you do not hear from us within 7 days, please contact the clinic through MyChart or phone. If you have a critical or abnormal lab result, we will notify you by phone as soon as possible.  Submit refill requests through [employer] or call your pharmacy and they will forward the refill request to us. Please allow 3 business days for your refill to be completed.          Additional Information About Your Visit       "  MyChart Information     [employer] gives you secure access to your electronic health record. If you see a primary care provider, you can also send messages to your care team and make appointments. If you have questions, please call your primary care clinic.  If you do not have a primary care provider, please call [phone] and they will assist you.        Care EveryWhere ID     This is your Care EveryWhere ID. This could be used by other organizations to access your [place] medical records  [id number]        Your Vitals Were     Pulse Respirations Height Pulse Oximetry BMI (Body Mass Index)       65 20 1.6 m (5' 3\") 100% 19.5 kg/m2        Blood Pressure from Last 3 Encounters:   08/30/18 105/58   02/14/18 116/66   08/17/17 98/56    Weight from Last 3 Encounters:   08/30/18 49.9 kg (110 lb 1.6 oz)   02/14/18 52.7 kg (116 lb 3.2 oz)   08/17/17 49.4 kg (108 lb 14.4 oz)               Primary Care Provider Office Phone # Fax #    Garry Echavarria [phone]       [facility] [address] [place] [address]        Equal Access to Services     ANIYAH GARLAND AH: Hadii aad ku hadasho Soomaali, waaxda luqadaha, qaybta kaalmada adeegyada, waxay idiin hayaan adeeg pao lalynsey ah. So [facility] [phone].    ATENCIÓN: Si habla español, tiene a bolivar disposición servicios gratuitos de asistencia lingüística. Llame al [phone].    We comply with applicable federal civil rights laws and [place] laws. We do not discriminate on the basis of race, color, national origin, age, disability, sex, sexual orientation, or gender identity.            Thank you!     Thank you for choosing [facility] MULTIPLE SCLEROSIS  for your care. Our goal is always to provide you with excellent care. Hearing back from our patients is one way we can continue to improve our services. Please take a few minutes to complete the written survey that you may receive in the mail after your visit with us. Thank you!           "   Your Updated Medication List - Protect others around you: Learn how to safely use, store and throw away your medicines at www.disposemymeds.org.          This list is accurate as of 8/30/18  1:27 PM.  Always use your most recent med list.                   Brand Name Dispense Instructions for use Diagnosis    baclofen 10 MG tablet    LIORESAL    270 tablet    Take 1 tablet (10 mg) by mouth 3 times daily    Multiple sclerosis (H)       Dalfampridine 10 MG Tb12     180 tablet    Take 1 tablet (10 mg) by mouth 2 times daily    Multiple sclerosis (H)       ibuprofen 200 MG tablet    ADVIL/MOTRIN     Take 1-3 tablets by mouth daily as needed.        interferon beta-1a 44 MCG/0.5ML injection    REBIF    12 Syringe    Inject 0.5 mLs (44 mcg) Subcutaneous three times a week    Multiple sclerosis (H)       MULTIVITAMIN WOMEN Tabs      Take 1 tablet by mouth daily        Vitamin D3 400 units Caps      Take 1,000 mg by mouth daily

## 2018-08-30 NOTE — LETTER
8/30/2018     RE: Lydia William  [address] [place] [address]     Dear Colleague,    Thank you for referring your patient, Lydia William, to the [facility] MULTIPLE SCLEROSIS at [facility] acute [facility]. Please see a copy of my visit note below.    [facility] OUTPATIENT MULTIPLE SCLEROSIS CLINIC VISIT NOTE      REASON FOR VISIT: Lydia is a 58-year-old female patient who presents to the clinic today for regularly scheduled follow-up of her diagnosis of relapsing remitting multiple sclerosis.  She presents with evaluation alone.    HISTORY OF PRESENT ILLNESS: Copied forward from a previous note. In short, in 1987 patient had an episode of double vision.  She was seen by Ophthalmology.  She was diagnosed with MS after having an imaging study.  She also had a spinal tap.  She is unsure about the findings.  In 2005, her walking was affected.  She started seeing Dr. Vargas in 2006 and was started on Rebif. She continues to be on Rebif.  Brain MRIs has been stable since 2009.    INTERVAL HISTORY SINCE LAST VISIT: Overall doing well. No recent hospitalization or illness. Patient denies any new changes in vision, balance, strength or sensation suggestive of new relapse of multiple sclerosis since he/she was last seen here. She continues to be on Rebif subcutaneous injections 3 times a week.  Injections are going well except mild redness at the injection sites.  She denies any flulike side effects after injections.  Her CBC with differential and hepatic panel were essentially normal in February 2018.     She had an MRI prior to this clinic visit today.  For some reason she did not receive any IV contrast.  See the results below.  Results were discussed with the patient during her clinic visit.    She denies any eye symptoms, facial numbness, weakness, dizziness, speech, swallowing or hearing issues.  She is right-handed.  Strength is equal in bilateral upper and lower extremities.   "She denies numbness and tingling.  She reports ongoing issues with the balance but it has been staying the same for the past many years.  She do not use any assistive devices on a regular basis. Uses a scooter once a year when she goes to the [facility]. She reports mild fatigue.  She sleeps 7 hours at night.  For mild spasticity issues she takes baclofen 10 mg 3 times daily.  She is currently in the process of weaning it down to 10 mg twice daily.  She has been taking Ampyra at least since 2016. She reports mild frequency and urgency without any incontinence.  Denies major bowel issues.    8/30/2018 MRI Brain without contrast:  1. The study demonstrates greater than 20 foci of T2-hyperintensity  within the cerebral white matter consistent with the clinical  suspicion of demyelinating disease. Intravenous contrast not utilized.     2. Moderate diffuse cerebral atrophy, unchanged since the prior.    Vit D: She takes 2000 international units daily. Her most recent vitamin D level from February 2018 was 55.    PHYSICAL EXAMINATION:   VITAL SIGNS: /58 (BP Location: Right arm, Patient Position: Sitting, Cuff Size: Adult Regular)  Pulse 65  Resp 20  Ht 1.6 m (5' 3\")  Wt 49.9 kg (110 lb 1.6 oz)  SpO2 100%  BMI 19.5 kg/m2   CRANIAL NERVES:  Visual fields are full to confrontation.  The pupils are equal, round and reactive to light and there is no Albino Jasper pupil funduscopic examination demonstrates no optic pallor, or papilledema or retinal hemorrhage.  Extraocular movements are intact with no internuclear ophthalmoplegia.  No nystagmus.  Facial strength and sensation are normal.  Hearing is normal.  Palate elevation and tongue protrusion are normal.   POWER:  Strength is 5/5 in all extremities except for left lower extremity with hip flexors 4+/5, knee extensors 5/5, knee flexors 5/5, dorsiflexors 4+/5 on the left.   SENSORY:  Intact to light touch throughout.   MOTOR/CEREBELLAR:  There are no tremors or " myoclonus or other abnormal movements.  Tone is slightly increased in her left leg.  There is no appendicular ataxia on finger-to-nose testing.  Rapid alternating movements are slow in her left foot.  There is no pronator drift.   GAIT:  She has a wide-based gait.  Tandem walking moderately impaired.     ASSESSMENT/ PLAN:   1.  Relapsing remitting multiple sclerosis, on Rebif: Clinical exam remains stable compared to her previous exam.  Patient had an MRI today prior to this clinic visit but no IV contrast was given.  Overall no significant change from her previous MRI in 2016.  We will continue her on Rebif for now.  We will check a CBC with the differential and hepatic panel for Rebif monitoring.  She will follow-up with Dr. Mccall in 6 months.     2.  Vitamin D level.  She currently takes 2000 international units daily and her most recent vitamin D level was 55 in February 2018. I encouraged her to increase her vitamin D dose to 4000 international units daily.  We will check the level in a year.    3. Please contact us with questions or concerns.     Claudia Larsen CNP  [facility] Neurology          I spent 35 minutes with this patient today, greater than 50% of which was spent in counseling and coordination of care.     This note was generated using voice recognition software. While edited for content some inaccurate phrasing may be found.    Again, thank you for allowing me to participate in the care of your patient.      Sincerely,    RICHARD Linares CNP

## 2018-08-30 NOTE — NURSING NOTE
"Chief Complaint   Patient presents with     RECHECK     [facility] RETURN - MULTIPLE SCLEROSIS       Initial /58 (BP Location: Right arm, Patient Position: Sitting, Cuff Size: Adult Regular)  Pulse 65  Resp 20  Ht 1.6 m (5' 3\")  Wt 49.9 kg (110 lb 1.6 oz)  SpO2 100%  BMI 19.5 kg/m2 Estimated body mass index is 19.5 kg/(m^2) as calculated from the following:    Height as of this encounter: 1.6 m (5' 3\").    Weight as of this encounter: 49.9 kg (110 lb 1.6 oz)..  BP completed using cuff size: regular  Medications Reconciled: Yes  Macrie Shelley CMA  12:15 PM          "

## 2018-08-30 NOTE — PATIENT INSTRUCTIONS
1. Continue Rebif.    2. Labs today.     3. Increase vit D to 4000 international units daily.    4. F/u with Dr. Mccall in 6 months.

## 2018-08-31 NOTE — PROGRESS NOTES
[facility] OUTPATIENT MULTIPLE SCLEROSIS CLINIC VISIT NOTE      REASON FOR VISIT: Lydia is a 58-year-old female patient who presents to the clinic today for regularly scheduled follow-up of her diagnosis of relapsing remitting multiple sclerosis.  She presents with evaluation alone.    HISTORY OF PRESENT ILLNESS: Copied forward from a previous note. In short, in 1987 patient had an episode of double vision.  She was seen by Ophthalmology.  She was diagnosed with MS after having an imaging study.  She also had a spinal tap.  She is unsure about the findings.  In 2005, her walking was affected.  She started seeing Dr. Vargas in 2006 and was started on Rebif. She continues to be on Rebif.  Brain MRIs has been stable since 2009.    INTERVAL HISTORY SINCE LAST VISIT: Overall doing well. No recent hospitalization or illness. Patient denies any new changes in vision, balance, strength or sensation suggestive of new relapse of multiple sclerosis since he/she was last seen here. She continues to be on Rebif subcutaneous injections 3 times a week.  Injections are going well except mild redness at the injection sites.  She denies any flulike side effects after injections.  Her CBC with differential and hepatic panel were essentially normal in February 2018.     She had an MRI prior to this clinic visit today.  For some reason she did not receive any IV contrast.  See the results below.  Results were discussed with the patient during her clinic visit.    She denies any eye symptoms, facial numbness, weakness, dizziness, speech, swallowing or hearing issues.  She is right-handed.  Strength is equal in bilateral upper and lower extremities.  She denies numbness and tingling.  She reports ongoing issues with the balance but it has been staying the same for the past many years.  She do not use any assistive devices on a regular basis. Uses a scooter once a year when she goes to the [employer]. She reports mild fatigue.   "She sleeps 7 hours at night.  For mild spasticity issues she takes baclofen 10 mg 3 times daily.  She is currently in the process of weaning it down to 10 mg twice daily.  She has been taking Ampyra at least since 2016. She reports mild frequency and urgency without any incontinence.  Denies major bowel issues.    8/30/2018 MRI Brain without contrast:  1. The study demonstrates greater than 20 foci of T2-hyperintensity  within the cerebral white matter consistent with the clinical  suspicion of demyelinating disease. Intravenous contrast not utilized.     2. Moderate diffuse cerebral atrophy, unchanged since the prior.    Vit D: She takes 2000 international units daily. Her most recent vitamin D level from February 2018 was 55.    PHYSICAL EXAMINATION:   VITAL SIGNS: /58 (BP Location: Right arm, Patient Position: Sitting, Cuff Size: Adult Regular)  Pulse 65  Resp 20  Ht 1.6 m (5' 3\")  Wt 49.9 kg (110 lb 1.6 oz)  SpO2 100%  BMI 19.5 kg/m2   CRANIAL NERVES:  Visual fields are full to confrontation.  The pupils are equal, round and reactive to light and there is no Albino Jasper pupil funduscopic examination demonstrates no optic pallor, or papilledema or retinal hemorrhage.  Extraocular movements are intact with no internuclear ophthalmoplegia.  No nystagmus.  Facial strength and sensation are normal.  Hearing is normal.  Palate elevation and tongue protrusion are normal.   POWER:  Strength is 5/5 in all extremities except for left lower extremity with hip flexors 4+/5, knee extensors 5/5, knee flexors 5/5, dorsiflexors 4+/5 on the left.   SENSORY:  Intact to light touch throughout.   MOTOR/CEREBELLAR:  There are no tremors or myoclonus or other abnormal movements.  Tone is slightly increased in her left leg.  There is no appendicular ataxia on finger-to-nose testing.  Rapid alternating movements are slow in her left foot.  There is no pronator drift.   GAIT:  She has a wide-based gait.  Tandem walking " moderately impaired.     ASSESSMENT/ PLAN:   1.  Relapsing remitting multiple sclerosis, on Rebif: Clinical exam remains stable compared to her previous exam.  Patient had an MRI today prior to this clinic visit but no IV contrast was given.  Overall no significant change from her previous MRI in 2016.  We will continue her on Rebif for now.  We will check a CBC with the differential and hepatic panel for Rebif monitoring.  She will follow-up with Dr. Mccall in 6 months.     2.  Vitamin D level.  She currently takes 2000 international units daily and her most recent vitamin D level was 55 in February 2018. I encouraged her to increase her vitamin D dose to 4000 international units daily.  We will check the level in a year.    3. Please contact us with questions or concerns.     Claudia Larsen, CNP  [facility]          I spent 35 minutes with this patient today, greater than 50% of which was spent in counseling and coordination of care.     This note was generated using voice recognition software. While edited for content some inaccurate phrasing may be found.

## 2019-03-05 ENCOUNTER — OFFICE VISIT (OUTPATIENT)
Dept: NEUROLOGY | Facility: CLINIC | Age: 59
End: 2019-03-05
Attending: PSYCHIATRY & NEUROLOGY
Payer: COMMERCIAL

## 2019-03-05 ENCOUNTER — APPOINTMENT (OUTPATIENT)
Dept: LAB | Facility: CLINIC | Age: 59
End: 2019-03-05
Payer: COMMERCIAL

## 2019-03-05 VITALS
SYSTOLIC BLOOD PRESSURE: 107 MMHG | DIASTOLIC BLOOD PRESSURE: 71 MMHG | HEIGHT: 63 IN | BODY MASS INDEX: 19.88 KG/M2 | HEART RATE: 71 BPM | WEIGHT: 112.2 LBS

## 2019-03-05 DIAGNOSIS — G35 MULTIPLE SCLEROSIS (H): ICD-10-CM

## 2019-03-05 DIAGNOSIS — G35 MULTIPLE SCLEROSIS (H): Primary | ICD-10-CM

## 2019-03-05 LAB
ALBUMIN SERPL-MCNC: 3.8 G/DL (ref 3.4–5)
ALP SERPL-CCNC: 96 U/L (ref 40–150)
ALT SERPL W P-5'-P-CCNC: 25 U/L (ref 0–50)
ANION GAP SERPL CALCULATED.3IONS-SCNC: 7 MMOL/L (ref 3–14)
AST SERPL W P-5'-P-CCNC: 20 U/L (ref 0–45)
BASOPHILS # BLD AUTO: 0 10E9/L (ref 0–0.2)
BASOPHILS NFR BLD AUTO: 0.2 %
BILIRUB SERPL-MCNC: 0.4 MG/DL (ref 0.2–1.3)
BUN SERPL-MCNC: 21 MG/DL (ref 7–30)
CALCIUM SERPL-MCNC: 8.8 MG/DL (ref 8.5–10.1)
CHLORIDE SERPL-SCNC: 103 MMOL/L (ref 94–109)
CO2 SERPL-SCNC: 28 MMOL/L (ref 20–32)
CREAT SERPL-MCNC: 0.6 MG/DL (ref 0.52–1.04)
DIFFERENTIAL METHOD BLD: NORMAL
EOSINOPHIL # BLD AUTO: 0.1 10E9/L (ref 0–0.7)
EOSINOPHIL NFR BLD AUTO: 1.1 %
ERYTHROCYTE [DISTWIDTH] IN BLOOD BY AUTOMATED COUNT: 12.7 % (ref 10–15)
GFR SERPL CREATININE-BSD FRML MDRD: >90 ML/MIN/{1.73_M2}
GLUCOSE SERPL-MCNC: 74 MG/DL (ref 70–99)
HCT VFR BLD AUTO: 39.2 % (ref 35–47)
HGB BLD-MCNC: 13.1 G/DL (ref 11.7–15.7)
IMM GRANULOCYTES # BLD: 0 10E9/L (ref 0–0.4)
IMM GRANULOCYTES NFR BLD: 0 %
LYMPHOCYTES # BLD AUTO: 1.2 10E9/L (ref 0.8–5.3)
LYMPHOCYTES NFR BLD AUTO: 27.4 %
MCH RBC QN AUTO: 31 PG (ref 26.5–33)
MCHC RBC AUTO-ENTMCNC: 33.4 G/DL (ref 31.5–36.5)
MCV RBC AUTO: 93 FL (ref 78–100)
MONOCYTES # BLD AUTO: 0.4 10E9/L (ref 0–1.3)
MONOCYTES NFR BLD AUTO: 8.6 %
NEUTROPHILS # BLD AUTO: 2.8 10E9/L (ref 1.6–8.3)
NEUTROPHILS NFR BLD AUTO: 62.7 %
NRBC # BLD AUTO: 0 10*3/UL
NRBC BLD AUTO-RTO: 0 /100
PLATELET # BLD AUTO: 176 10E9/L (ref 150–450)
POTASSIUM SERPL-SCNC: 4.3 MMOL/L (ref 3.4–5.3)
PROT SERPL-MCNC: 8 G/DL (ref 6.8–8.8)
RBC # BLD AUTO: 4.23 10E12/L (ref 3.8–5.2)
SODIUM SERPL-SCNC: 138 MMOL/L (ref 133–144)
TSH SERPL DL<=0.005 MIU/L-ACNC: 1.43 MU/L (ref 0.4–4)
WBC # BLD AUTO: 4.4 10E9/L (ref 4–11)

## 2019-03-05 PROCEDURE — G0463 HOSPITAL OUTPT CLINIC VISIT: HCPCS | Mod: ZF

## 2019-03-05 PROCEDURE — 85025 COMPLETE CBC W/AUTO DIFF WBC: CPT | Performed by: PSYCHIATRY & NEUROLOGY

## 2019-03-05 PROCEDURE — 80053 COMPREHEN METABOLIC PANEL: CPT | Performed by: PSYCHIATRY & NEUROLOGY

## 2019-03-05 PROCEDURE — 82306 VITAMIN D 25 HYDROXY: CPT | Performed by: PSYCHIATRY & NEUROLOGY

## 2019-03-05 PROCEDURE — 84443 ASSAY THYROID STIM HORMONE: CPT | Performed by: PSYCHIATRY & NEUROLOGY

## 2019-03-05 ASSESSMENT — MIFFLIN-ST. JEOR: SCORE: 1058.07

## 2019-03-05 ASSESSMENT — PAIN SCALES - GENERAL: PAINLEVEL: NO PAIN (0)

## 2019-03-05 NOTE — TELEPHONE ENCOUNTER
Received refill request for Rebif from [facility]; Patient was last seen in March by Dr. Mccall and has follow up appointment in September with Claudia Larsen; Refilled for 1 year per MS refill protocol.    Milana Lewis, MS RN Care Coordinator

## 2019-03-05 NOTE — LETTER
3/5/2019       RE: Lydia William  [address] [place] [address]     Dear Colleague,    Thank you for referring your patient, Lydia William, to the [facility] MULTIPLE SCLEROSIS at [facility]. Please see a copy of my visit note below.    MULTIPLE SCLEROSIS CLINIC AT THE [facility]  FOLLOWUP/ESTABLISHED PATIENT VISIT    PRINCIPAL NEUROLOGIC DIAGNOSIS: Multiple Sclerosis     Date of Onset: 1987  Date of Diagnosis: 1987  Initial Clinical Course: Relapsing Remitting  Current Clinical Course: Relapsing Remitting  Past Disease Modifying Therapy(ies): NA  Current Disease Modifying Therapy(ies):Rebif  Most Recent MRI of the Brain:  8/30/18  Most Recent MRI of the Cervical Cord NA  Most Recent MRI of the Thoracic Cord: NA  Most Recent Lumbar Puncture: NA  Most Recent OCT: NA   Most Recent JCV:  NA  Most Recent Remote Hepatitis Panel:    Most Recent VZV IgG:  NA  Most Recent TB Quant:  NA      CHIEF COMPLAINT: Follow up on DMT      INTERVAL HISTORY:    The patient reports that she is doing well. She currently does not feel like she is having symptoms. She denies having any side effects from rebif    Issues with current MS therapy: Tolerating DMT without issue    REVIEW OF SYSTEMS:    Mood: unchanged  Spasticity:occasional non-troublesome. They occur randomly  Bladder: unchanged  Bowel: unchanged  Pain related to today's visit:reviewed on nursing intake documentation  Fatigue: unchanged  Sleep: insomnia , interrupted and has difficulty followingback to sleep  Memory/Concentration: unchanged    Otherwise 10 point ROS was neg other than the symptoms noted above.    PAST HISTORY was reviewed and updated:      MEDICATIONS and ALLERGIES were reviewed and updated.    SOCIAL HISTORY was reviewed and updated:    EXAM:    PHYSICAL EXAMINATION:   VITAL SIGNS:  B/P: 107/71, T: Data Unavailable, P: 71, R: Data Unavailable    GENERAL: The patient is a well-nourished  who presents  to the evaluation alone.  NEUROLOGIC:   MENTAL STATUS: Alert,awake and  oriented times four.   CRANIAL NERVES:  Visual fields are full to confrontation. The pupils are  round and react to light and there is no Albino Jasper pupil. Extraocular movements are  intact with no  internuclear ophthalmoplegia. Left gaze evoked nystagmus. Facial strength and sensation are  normal. Hearing is  normal. Palate elevation and tongue protrusion are  normal.   POWER:      Motor     Upper         Right Left   Shoulder Abduction 5 5   Elbow Flexion 5 5   Elbow Extension 5 4   Wrist Extension 5 5   Digit Extension 5 5   Digit Flexion 5 5   APB 5 5   Tone 1 1   Lower          Right Left   Hip Flexion 5 5   Knee Extension 5 5   Knee Flexion 5 5   Foot Dorsiflexion 5 5   Foot Plantar Flexion 5 5   EH 5 5   Toe Flexion 5 5   Tone 1 1             Grade Description   0 No increase in muscle tone   1 Slight increase in muscle tone, manifested by a catch and release or by minimal resistance at the end of the range of motion when the affected part(s) is moved in flexion or extension   1+ Slight increase in muscle tone, manifested by a catch, followed by minimal resistance throughout the remainder (less than half) of the ROM   2 More marked increase in muscle tone through most of the ROM, but affected part(s) easily moved   3 Considerable increase in muscle tone, passive movement difficult   4 Affected part(s) rigid in flexion or extension               SENSORY:      Light touch:  Intact in all extremities        MOTOR/CEREBELLAR:     Right Left   RRM 1 abnormal 1 Abnormal   TRUPTI 1 Abnormal 1 Abnormal   FTN 1 Abnormal 1 Abnormal   RRM 0 Normal 0 Normal   HKS 0 Normal 0 Normal         GAIT: Gait is wide-based, spastic with circumduction of the left leg, and steady and the patient is able to walk on her toes.  She can not walk on heels. She can not walk in tandem.    Romberg: Stable with eye(s) closed    RESULTS:  Monitoring labs:    No visits with  results within 6 Month(s) from this visit.   Latest known visit with results is:   Orders Only on 08/30/2018   Component Date Value Ref Range Status     WBC 08/30/2018 4.7  4.0 - 11.0 10e9/L Final     RBC Count 08/30/2018 4.22  3.8 - 5.2 10e12/L Final     Hemoglobin 08/30/2018 12.8  11.7 - 15.7 g/dL Final     Hematocrit 08/30/2018 38.1  35.0 - 47.0 % Final     MCV 08/30/2018 90  78 - 100 fl Final     MCH 08/30/2018 30.3  26.5 - 33.0 pg Final     MCHC 08/30/2018 33.6  31.5 - 36.5 g/dL Final     RDW 08/30/2018 12.7  10.0 - 15.0 % Final     Platelet Count 08/30/2018 176  150 - 450 10e9/L Final     Diff Method 08/30/2018 Automated Method   Final     % Neutrophils 08/30/2018 64.2  % Final     % Lymphocytes 08/30/2018 26.4  % Final     % Monocytes 08/30/2018 8.2  % Final     % Eosinophils 08/30/2018 0.8  % Final     % Basophils 08/30/2018 0.2  % Final     % Immature Granulocytes 08/30/2018 0.2  % Final     Nucleated RBCs 08/30/2018 0  0 /100 Final     Absolute Neutrophil 08/30/2018 3.0  1.6 - 8.3 10e9/L Final     Absolute Lymphocytes 08/30/2018 1.3  0.8 - 5.3 10e9/L Final     Absolute Monocytes 08/30/2018 0.4  0.0 - 1.3 10e9/L Final     Absolute Eosinophils 08/30/2018 0.0  0.0 - 0.7 10e9/L Final     Absolute Basophils 08/30/2018 0.0  0.0 - 0.2 10e9/L Final     Abs Immature Granulocytes 08/30/2018 0.0  0 - 0.4 10e9/L Final     Absolute Nucleated RBC 08/30/2018 0.0   Final     Bilirubin Direct 08/30/2018 0.1  0.0 - 0.2 mg/dL Final     Bilirubin Total 08/30/2018 0.5  0.2 - 1.3 mg/dL Final     Albumin 08/30/2018 3.8  3.4 - 5.0 g/dL Final     Protein Total 08/30/2018 7.9  6.8 - 8.8 g/dL Final     Alkaline Phosphatase 08/30/2018 84  40 - 150 U/L Final     ALT 08/30/2018 27  0 - 50 U/L Final     AST 08/30/2018 24  0 - 45 U/L Final       MRI brain:    no new MRI to review    ASSESSMENT/PLAN:  The patient is a 58-year-old woman with a past medical history of relapsing remitting multiple sclerosis who is presenting today as a  follow-up.  Overall, the patient has shown some subtle signs of worsening over the past year.  The patient is having more difficulty walking on her heels.  Otherwise clinically the patient is largely unchanged.  I think the patient probably entering the post progressive stage of disease.  At this point, I am uncertain if the Rebif is helping her.  However, it is difficult to say whether she is not had any recent relapses because the Rebif has been so effective for her or which she has had just as a benign of course without the Rebif.  We agreed that we would continue her on this medication for another year. WE will need to check blood work to ensure that she is not having any side effects.  We will get an MRI next spring.  At that time if it continues to be unchanged, we will consider stopping Rebif.  I answered some questions about potentially switching the patient to an oral medication.  If I were to switch I would probably choose Aubagio.  I be hesitant to prescribe Tecfidera or Gilenya the patient her age to see the risk of PML.  I also answered questions for her about medical cannabis.  I will tentatively see her in 1 year.  I will have her see seen in 6 months.  I instructed her to call my office with any questions, concerns, issues or problems.    Check tsh, vitamin D, cbc, cmp    I spent 25 minutes in this visit, with >50% direct patient time spent counseling about prognosis, treatment options, and coordination of care.    Eladio Mccall MD [facility]  Staff Neurologist   03/05/19     (Chart documentation was completed in part with Dragon voice-recognition software. Even though reviewed, some grammatical, spelling, and word errors may remain.)

## 2019-03-05 NOTE — PROGRESS NOTES
MULTIPLE SCLEROSIS CLINIC AT THE [facility]  FOLLOWUP/ESTABLISHED PATIENT VISIT      PRINCIPAL NEUROLOGIC DIAGNOSIS: Multiple Sclerosis     Date of Onset: 1987  Date of Diagnosis: 1987  Initial Clinical Course: Relapsing Remitting  Current Clinical Course: Relapsing Remitting  Past Disease Modifying Therapy(ies): NA  Current Disease Modifying Therapy(ies):Rebif  Most Recent MRI of the Brain: 8/30/18  Most Recent MRI of the Cervical Cord NA  Most Recent MRI of the Thoracic Cord: NA  Most Recent Lumbar Puncture: NA  Most Recent OCT: NA   Most Recent JCV:  NA  Most Recent Remote Hepatitis Panel:    Most Recent VZV IgG:  NA  Most Recent TB Quant:  NA      CHIEF COMPLAINT: Follow up on DMT      INTERVAL HISTORY:    The patient reports that she is doing well. She currently does not feel like she is having symptoms. She denies having any side effects from rebif    Issues with current MS therapy: Tolerating DMT without issue    REVIEW OF SYSTEMS:    Mood: unchanged  Spasticity:occasional non-troublesome. They occur randomly  Bladder: unchanged  Bowel: unchanged  Pain related to today's visit:reviewed on nursing intake documentation  Fatigue: unchanged  Sleep: insomnia , interrupted and has difficulty followingback to sleep  Memory/Concentration: unchanged      Otherwise 10 point ROS was neg other than the symptoms noted above.    PAST HISTORY was reviewed and updated:      MEDICATIONS and ALLERGIES were reviewed and updated.    SOCIAL HISTORY was reviewed and updated:    5}    EXAM:    PHYSICAL EXAMINATION:   VITAL SIGNS:  B/P: 107/71, T: Data Unavailable, P: 71, R: Data Unavailable    GENERAL: The patient is a well-nourished  who presents to the evaluation alone.  NEUROLOGIC:   MENTAL STATUS: Alert,awake and  oriented times four.   CRANIAL NERVES:  Visual fields are full to confrontation. The pupils are  round and react to light and there is no Albino Jasper pupil. Extraocular movements are  intact with no   internuclear ophthalmoplegia. Left gaze evoked nystagmus. Facial strength and sensation are  normal. Hearing is  normal. Palate elevation and tongue protrusion are  normal.   POWER:      Motor     Upper         Right Left   Shoulder Abduction 5 5   Elbow Flexion 5 5   Elbow Extension 5 4   Wrist Extension 5 5   Digit Extension 5 5   Digit Flexion 5 5   APB 5 5   Tone 1 1   Lower          Right Left   Hip Flexion 5 5   Knee Extension 5 5   Knee Flexion 5 5   Foot Dorsiflexion 5 5   Foot Plantar Flexion 5 5   EH 5 5   Toe Flexion 5 5   Tone 1 1             Grade Description   0 No increase in muscle tone   1 Slight increase in muscle tone, manifested by a catch and release or by minimal resistance at the end of the range of motion when the affected part(s) is moved in flexion or extension   1+ Slight increase in muscle tone, manifested by a catch, followed by minimal resistance throughout the remainder (less than half) of the ROM   2 More marked increase in muscle tone through most of the ROM, but affected part(s) easily moved   3 Considerable increase in muscle tone, passive movement difficult   4 Affected part(s) rigid in flexion or extension               SENSORY:      Light touch:  Intact in all extremities        MOTOR/CEREBELLAR:     Right Left   RRM 1 abnormal 1 Abnormal   TRUPTI 1 Abnormal 1 Abnormal   FTN 1 Abnormal 1 Abnormal   RRM 0 Normal 0 Normal   HKS 0 Normal 0 Normal               GAIT: Gait is wide-based, spastic with circumduction of the left leg, and steady and the patient is able to walk on her toes. She can not walk on heels. She can not walk in tandem.    Romberg: Stable with eye(s) closed      RESULTS:  Monitoring labs:    No visits with results within 6 Month(s) from this visit.   Latest known visit with results is:   Orders Only on 08/30/2018   Component Date Value Ref Range Status     WBC 08/30/2018 4.7  4.0 - 11.0 10e9/L Final     RBC Count 08/30/2018 4.22  3.8 - 5.2 10e12/L Final      Hemoglobin 08/30/2018 12.8  11.7 - 15.7 g/dL Final     Hematocrit 08/30/2018 38.1  35.0 - 47.0 % Final     MCV 08/30/2018 90  78 - 100 fl Final     MCH 08/30/2018 30.3  26.5 - 33.0 pg Final     MCHC 08/30/2018 33.6  31.5 - 36.5 g/dL Final     RDW 08/30/2018 12.7  10.0 - 15.0 % Final     Platelet Count 08/30/2018 176  150 - 450 10e9/L Final     Diff Method 08/30/2018 Automated Method   Final     % Neutrophils 08/30/2018 64.2  % Final     % Lymphocytes 08/30/2018 26.4  % Final     % Monocytes 08/30/2018 8.2  % Final     % Eosinophils 08/30/2018 0.8  % Final     % Basophils 08/30/2018 0.2  % Final     % Immature Granulocytes 08/30/2018 0.2  % Final     Nucleated RBCs 08/30/2018 0  0 /100 Final     Absolute Neutrophil 08/30/2018 3.0  1.6 - 8.3 10e9/L Final     Absolute Lymphocytes 08/30/2018 1.3  0.8 - 5.3 10e9/L Final     Absolute Monocytes 08/30/2018 0.4  0.0 - 1.3 10e9/L Final     Absolute Eosinophils 08/30/2018 0.0  0.0 - 0.7 10e9/L Final     Absolute Basophils 08/30/2018 0.0  0.0 - 0.2 10e9/L Final     Abs Immature Granulocytes 08/30/2018 0.0  0 - 0.4 10e9/L Final     Absolute Nucleated RBC 08/30/2018 0.0   Final     Bilirubin Direct 08/30/2018 0.1  0.0 - 0.2 mg/dL Final     Bilirubin Total 08/30/2018 0.5  0.2 - 1.3 mg/dL Final     Albumin 08/30/2018 3.8  3.4 - 5.0 g/dL Final     Protein Total 08/30/2018 7.9  6.8 - 8.8 g/dL Final     Alkaline Phosphatase 08/30/2018 84  40 - 150 U/L Final     ALT 08/30/2018 27  0 - 50 U/L Final     AST 08/30/2018 24  0 - 45 U/L Final   ]      MRI brain:    no new MRI to review    ASSESSMENT/PLAN:  The patient is a 58-year-old woman with a past medical history of relapsing remitting multiple sclerosis who is presenting today as a follow-up.  Overall, the patient has shown some subtle signs of worsening over the past year.  The patient is having more difficulty walking on her heels.  Otherwise clinically the patient is largely unchanged.  I think the patient probably entering the post  progressive stage of disease.  At this point, I am uncertain if the Rebif is helping her.  However, it is difficult to say whether she is not had any recent relapses because the Rebif has been so effective for her or which she has had just as a benign of course without the Rebif.  We agreed that we would continue her on this medication for another year. WE will need to check blood work to ensure that she is not having any side effects.  We will get an MRI next spring.  At that time if it continues to be unchanged, we will consider stopping Rebif.  I answered some questions about potentially switching the patient to an oral medication.  If I were to switch I would probably choose Aubagio.  I be hesitant to prescribe Tecfidera or Gilenya the patient her age to see the risk of PML.  I also answered questions for her about medical cannabis.  I will tentatively see her in 1 year.  I will have her see seen in 6 months.  I instructed her to call my office with any questions, concerns, issues or problems.    Check tsh, vitamin D, cbc, cmp    I spent 25 minutes in this visit, with >50% direct patient time spent counseling about prognosis, treatment options, and coordination of care.    Eladio Mccall MD [facility]  Staff Neurologist   03/05/19       (Chart documentation was completed in part with Dragon voice-recognition software. Even though reviewed, some grammatical, spelling, and word errors may remain.)

## 2019-03-05 NOTE — PATIENT INSTRUCTIONS
Will get blood work today    Will have you follow up with Jose in 6 month    You saw a neurology provider today at the [facility].  You may have also met with the MS RN Care Coordinator.  In order to get a message to your MS Center provider, you should contact [phone] option 3 for the triage nurse line.    You should contact us via this protocol if you have any of the following symptoms:    New or worsening neurologic symptoms that persist for 24-48 hours, such as:  o New onset of pain or marked worsening of pain  o Difficulty with speech, swallowing, or breathing  o New onset of vertigo or dizziness  o Change in bowel or bladder function (incontinence, difficulty urinating)    Increasing difficulty in self care    Marked changes in vision (double vision, blurred vision, graying of vision)    Change in mobility    Change in cognitive function    Falling    Worsening numbness, tingling or pain with a change in function    Worsening fatigue lasting more than 2 weeks  If you had labs completed today, we will contact you with the results.  If you are active in [employer], they will be released to you there.  Otherwise, your results will be provided to you via mail or telephone call.  Some results take up to 2 weeks for completion.  If you haven t heard anything about your lab results within 2 weeks, you can call or send a [employer] message to obtain your results.  If you have an MRI scheduled in the week or two prior to your next appointment, we will go over the results at your scheduled follow up appointment.  If you are not scheduled to see your MS Center provider within about 2 weeks after your MRI, please call or send a [employer] message to obtain your results if you haven t heard anything within 2 weeks.  Please be aware that it takes at least 5 business days after routine MRIs for your results to be reviewed by both the radiologist and your doctor.  MRIs completed at  facilities outside of the [facility] system take about 2 weeks in order for the MRI disc to be mailed to our clinic and uploaded into your medical record.    Prescription refills should be faxed to us by your pharmacy.  Our fax number for prescription refills is [phone].  Please do your best to come to your appointments, and to arrive 15 minutes early to allow time for checking in.  [facility] reserves the right to terminate care of established patients if a patient misses three or more appointments in a clinic without providing notification within a 12-month period.    Developing Your Care Team  Individuals living with chronic illnesses like MS may be unaware that they are at risk for the same range of medical problems as everyone else.  This is why you must establish a relationship with other health care providers in addition to your Multiple Sclerosis doctor.  It can be difficult at times to figure out whether a health concern is related to your MS, or whether it is related to something else, such as hormonal changes, pseduoexacerbations, changes in your core body temperature, flu-like reactions to interferons, exercise, or infections.  Urinary tract infections (UTIs) are common culprits that can cause fatigue, weakness, or other  MS attack -like symptoms without classic symptoms of a UTI.  For this reason, if you call or come in to discuss symptoms, you may be asked to get in touch with your primary provider or another specialist, so that you receive the comprehensive care you need.  What is Multiple Sclerosis (MS)?  MS is a disease in which the nerve tissues in the brain and/or spinal cord are attacked by immune cells in the body.  These immune cells are present in everyone, and their normal role is to fight off infections.  In people with MS, these cells change the way they function and cross into the nervous system.  Once there, they cause inflammation that damages the myelin (or  the protective coating of a nerve cell, much like the plastic covering on an electrical cord) and parts of the nerve cell itself.  So far, a clear cause for this immune system dysfunction has not been found.  MS often starts out as the  relapsing-remitting  form.  This means there are episodes when you have symptoms, and other times when you recover to normal or near-normal.  Over time, if the damage to the nervous system continues, the disease can cause additional disability, such as difficulty walking.  If the relapses and nerve damage can be prevented with available medications, many patients with MS can go many years between relapses and have relatively little disability.  Remember: MS is a condition that changes and must be evaluated on an ongoing basis!  What is a Relapse? (Also called flare-ups, attacks, or exacerbations)  Relapses are due to the occurrence of inflammation in some part of the brain and/or spinal cord.  A relapse is new or recurrent symptoms which persist for at least 24 hours and sometimes worsen over 48 hours.  New symptoms need to be  by at least 1 month in order to be considered separate relapses. Most of the time, symptoms reach their maximal intensity within 2 weeks and then begin to slowly resolve.  At times, your symptoms may not recover fully for up to 6 months, depending on the severity of the episode.  The frequency of relapses is generally higher early in the disease, but can vary greatly among individuals with MS.  Improvement of symptoms for an individual is unpredictable with each relapse.    It is important to remember that an increase in symptoms and changes in function may not necessarily be a relapse.  There are other factors that contribute to such changes, such as hot weather, increased body temperature, infection/illness, stress and sleep deprivation.  The worsening of symptoms may feel like a relapse when in reality it is not.  These episodes are referred to as  pseudorelapses.  Once the underlying cause is addressed, symptoms usually fade away and you feel better.  If you experience a worsening of symptoms that lasts more than 48 hours and does not improve with cooling down, decreasing stress, or treatment of an infection, please call us and we can help to better determine whether you are having a pseudorelapse versus a relapse.

## 2019-03-06 LAB — DEPRECATED CALCIDIOL+CALCIFEROL SERPL-MC: 91 UG/L (ref 20–75)

## 2019-03-11 DIAGNOSIS — G35 MULTIPLE SCLEROSIS (H): ICD-10-CM

## 2019-03-11 RX ORDER — DALFAMPRIDINE 10 MG/1
10 TABLET, FILM COATED, EXTENDED RELEASE ORAL 2 TIMES DAILY
Qty: 180 TABLET | Refills: 3 | Status: SHIPPED | OUTPATIENT
Start: 2019-03-11 | End: 2020-03-12

## 2019-03-11 NOTE — TELEPHONE ENCOUNTER
Writer received a refill request from: [facility] .     Pending Prescriptions:                       Disp   Refills    Dalfampridine 10 MG TB12                  180 ta*3            Sig: Take 1 tablet (10 mg) by mouth 2 times daily    Pt's last office visit: 3/5/19  Next scheduled office visit: 9/5/19      Per the RN/LPN medication refill protocol, writer is ablr to refill this request. If able to refill, please call the pt to inform them the RX was sent to the pharmacy. If unable to refill, route this encounter to the prescribing physician for authorization or further instructions.      Nereyda Dior LPN

## 2019-03-18 DIAGNOSIS — G35 MULTIPLE SCLEROSIS (H): ICD-10-CM

## 2019-03-18 RX ORDER — BACLOFEN 10 MG/1
10 TABLET ORAL 3 TIMES DAILY
Qty: 270 TABLET | Refills: 3 | Status: SHIPPED | OUTPATIENT
Start: 2019-03-18 | End: 2020-03-12

## 2019-03-18 NOTE — TELEPHONE ENCOUNTER
Received refill request for Baclofen from [facility]; Patient was last seen in March by Dr. Mccall and has follow up appointment in September with Claudia Larsen; Refilled for 1 year per MS refill protocol.    Milana Lewis, MS RN Care Coordinator

## 2019-08-26 ENCOUNTER — TELEPHONE (OUTPATIENT)
Dept: NEUROLOGY | Facility: CLINIC | Age: 59
End: 2019-08-26

## 2019-08-26 NOTE — TELEPHONE ENCOUNTER
Prior Authorization Approval    Authorization Effective Date: 8/26/2019  Authorization Expiration Date: 2/26/2020  Medication: Dalfampridine 10 MG TB12 (APPROVED)  Approved Dose/Quantity: 30 days  Reference #: CMM KEY: [id number]   Insurance Company: [employer] ([facility]) - Phone [phone] Fax [phone]  Expected CoPay:       CoPay Card Available:      Foundation Assistance Needed:    Which Pharmacy is filling the prescription (Not needed for infusion/clinic administered): [facility] [place] [address]  Pharmacy Notified: Yes  Patient Notified: Yes

## 2019-08-26 NOTE — TELEPHONE ENCOUNTER
Prior Authorization Specialty Medication Request    Medication/Dose: Dalfampridine 10 MG TB12 [[id number]]  ICD code (if different than what is on RX):  Multiple sclerosis and Neurologic Gait Dysfunction, G35  Previously Tried and Failed:  n/a    Important Lab Values: n/a  Rationale: Patient's symptoms have been well controlled on this medication, please approve.    Insurance Name: n/a  Insurance ID: n/a  Insurance Phone Number: n/a    Pharmacy Information (if different than what is on RX)  Name:  [facility]  Phone:  n/a

## 2019-08-26 NOTE — TELEPHONE ENCOUNTER
PA Initiation    Medication: Dalfampridine 10 MG TB12 (PENDING)  Insurance Company: [employer] ([facility]) - Phone [phone] Fax [phone]  Ph[place] [facility] [place] [address]  Filling Pharmacy Phone:    Filling Pharmacy Fax:    Start Date: 8/26/2019

## 2019-08-26 NOTE — TELEPHONE ENCOUNTER
[facility]    Phone Message    May a detailed message be left on voicemail: yes    Reason for Call: Other: Pharmacy calling to request prior auth be updated for medication Dalfampridine 10 MG TB12 [[id number]]. Pharmacy left number of 0  to call and update. Please advise     Action Taken: Message routed to:  Adult Clinics: Neurology p [id number]

## 2019-09-05 ENCOUNTER — OFFICE VISIT (OUTPATIENT)
Dept: NEUROLOGY | Facility: CLINIC | Age: 59
End: 2019-09-05
Attending: PSYCHIATRY & NEUROLOGY
Payer: COMMERCIAL

## 2019-09-05 VITALS
DIASTOLIC BLOOD PRESSURE: 64 MMHG | HEART RATE: 76 BPM | BODY MASS INDEX: 19.79 KG/M2 | HEIGHT: 63 IN | SYSTOLIC BLOOD PRESSURE: 100 MMHG | WEIGHT: 111.7 LBS

## 2019-09-05 DIAGNOSIS — G35 MULTIPLE SCLEROSIS (H): Primary | ICD-10-CM

## 2019-09-05 DIAGNOSIS — G35 MULTIPLE SCLEROSIS (H): ICD-10-CM

## 2019-09-05 LAB
ALBUMIN SERPL-MCNC: 3.8 G/DL (ref 3.4–5)
ALP SERPL-CCNC: 91 U/L (ref 40–150)
ALT SERPL W P-5'-P-CCNC: 23 U/L (ref 0–50)
AST SERPL W P-5'-P-CCNC: 15 U/L (ref 0–45)
BASOPHILS # BLD AUTO: 0 10E9/L (ref 0–0.2)
BASOPHILS NFR BLD AUTO: 0.4 %
BILIRUB DIRECT SERPL-MCNC: <0.1 MG/DL (ref 0–0.2)
BILIRUB SERPL-MCNC: 0.3 MG/DL (ref 0.2–1.3)
DIFFERENTIAL METHOD BLD: NORMAL
EOSINOPHIL # BLD AUTO: 0.1 10E9/L (ref 0–0.7)
EOSINOPHIL NFR BLD AUTO: 1.5 %
ERYTHROCYTE [DISTWIDTH] IN BLOOD BY AUTOMATED COUNT: 12.7 % (ref 10–15)
HCT VFR BLD AUTO: 38.8 % (ref 35–47)
HGB BLD-MCNC: 13 G/DL (ref 11.7–15.7)
IMM GRANULOCYTES # BLD: 0 10E9/L (ref 0–0.4)
IMM GRANULOCYTES NFR BLD: 0.2 %
LYMPHOCYTES # BLD AUTO: 1.3 10E9/L (ref 0.8–5.3)
LYMPHOCYTES NFR BLD AUTO: 27.1 %
MCH RBC QN AUTO: 31 PG (ref 26.5–33)
MCHC RBC AUTO-ENTMCNC: 33.5 G/DL (ref 31.5–36.5)
MCV RBC AUTO: 92 FL (ref 78–100)
MONOCYTES # BLD AUTO: 0.6 10E9/L (ref 0–1.3)
MONOCYTES NFR BLD AUTO: 11.7 %
NEUTROPHILS # BLD AUTO: 2.8 10E9/L (ref 1.6–8.3)
NEUTROPHILS NFR BLD AUTO: 59.1 %
NRBC # BLD AUTO: 0 10*3/UL
NRBC BLD AUTO-RTO: 0 /100
PLATELET # BLD AUTO: 181 10E9/L (ref 150–450)
PROT SERPL-MCNC: 7.7 G/DL (ref 6.8–8.8)
RBC # BLD AUTO: 4.2 10E12/L (ref 3.8–5.2)
WBC # BLD AUTO: 4.7 10E9/L (ref 4–11)

## 2019-09-05 PROCEDURE — 80076 HEPATIC FUNCTION PANEL: CPT | Performed by: NURSE PRACTITIONER

## 2019-09-05 PROCEDURE — 85025 COMPLETE CBC W/AUTO DIFF WBC: CPT | Performed by: NURSE PRACTITIONER

## 2019-09-05 PROCEDURE — 36415 COLL VENOUS BLD VENIPUNCTURE: CPT | Performed by: NURSE PRACTITIONER

## 2019-09-05 ASSESSMENT — PAIN SCALES - GENERAL: PAINLEVEL: NO PAIN (0)

## 2019-09-05 ASSESSMENT — MIFFLIN-ST. JEOR: SCORE: 1050.8

## 2019-09-05 NOTE — LETTER
9/5/2019       RE: Lydia William  [address] [place] [address]     Dear Colleague,    Thank you for referring your patient, Lydia William, to the [facility] at [facility]. Please see a copy of my visit note below.    [facility] OUTPATIENT MULTIPLE SCLEROSIS CLINIC VISIT NOTE    REASON FOR VISIT: Lydia is a 59 year old  who presents to the clinic today for regularly scheduled follow up of Relapsing Remitting MS. She was most recently seen by Dr. Mccall on 3/5/2019. She presents to the evaluation alone.     HISTORY OF PRESENT ILLNESS: Copied forward from my previous note. Please refer to previous clinic notes for detailed history.  In short, in 1987 patient had an episode of double vision.  She was seen by Ophthalmology.  She was diagnosed with MS after having an imaging study.  She also had a spinal tap.  She is unsure about the findings.  In 2005, her walking was affected.  She started seeing Dr. Vargas in 2006 and was started on Rebif. She continues to be on Rebif.     INTERVAL HISTORY SINCE LAST VISIT: Overall doing well. No recent hospitalization or illness. Patient denies any new changes in vision, balance, strength or sensation suggestive of new relapse of multiple sclerosis since he/she was last seen here.    She is currently on Rebif. Injections are going well. She reports mild injection site reactions as side effects from this medication.   Her blood counts and liver enzymes were stable in March 2019.    She denies any eye symptoms, facial numbness, weakness, dizziness, speech, swallowing or hearing issues.  She is right-handed.  Strength is equal in bilateral upper and lower extremities.  She denies numbness and tingling.  She reports ongoing issues with the balance but it has been staying the same for the past many years.  She do not use any assistive devices on a regular basis. Uses a scooter once a year when she goes to the [facility]  [facility]fair. She reports mild fatigue.  She sleeps 7 hours at night.    For mild spasticity issues she takes baclofen 10 mg 2 times daily.  This was weaned down from 3 times a day dose.  She has been taking Ampyra at least since 2016. She reports mild frequency and urgency without any incontinence.  Denies major bowel issues.    8/2018  MRI Brain:  Impression:   1. The study demonstrates greater than 20 foci of T2-hyperintensity  within the cerebral white matter consistent with the clinical  suspicion of demyelinating disease. Intravenous contrast not utilized.     2. Moderate diffuse cerebral atrophy, unchanged since the prior.    Vit D: She currently takes 4000 international units daily and her most recent level from March 2018 was 91.    PHYSICAL EXAMINATION:  VITAL SIGNS: /64 (BP Location: Right arm, Patient Position: Chair, Cuff Size: Adult Regular)   Pulse 76   Ht 1.6 m (5' 3\")   Wt 50.7 kg (111 lb 11.2 oz)   BMI 19.79 kg/m      MENTAL STATUS: Alert,awake and  oriented times four.   CRANIAL NERVES:  Visual fields are full to confrontation.  The pupils are equal, round and reactive to light and there is no Albino Jasper pupil funduscopic examination demonstrates no optic pallor, or papilledema or retinal hemorrhage.  Extraocular movements are intact with no internuclear ophthalmoplegia.  No nystagmus.  Facial strength and sensation are normal.  Hearing is normal.  Palate elevation and tongue protrusion are normal.   POWER:  Strength is 5/5 in all extremities except for left lower extremity with hip flexors 4+/5, knee extensors 5/5, knee flexors 5/5, dorsiflexors 4+/5 on the left.   SENSORY:  Intact to light touch throughout.   MOTOR/CEREBELLAR:  There are no tremors or myoclonus or other abnormal movements.  Tone is slightly increased in her left leg.  There is no appendicular ataxia on finger-to-nose testing.  Rapid alternating movements are slow in her left foot.  There is no pronator drift.   GAIT:  She has " a wide-based, spastic gait.      ASSESSMENT/ PLAN:   1. Relapsing remitting multiple sclerosis, on Rebif: Clinical exam remains stable compared to her previous exam.  She will continue Rebif for now follow-up with Dr. Mccall in spring 2020 after brain imaging.    2. Please contact us with questions or concerns    Claudia Larsen CNP  [facility]    I spent 25 minutes with this patient today, greater than 50% of which was spent in counseling and coordination of care.     (Chart documentation was completed in part with Dragon voice-recognition software. Even though reviewed, some grammatical, spelling, and word errors may remain.)

## 2019-09-05 NOTE — PATIENT INSTRUCTIONS
1. Labs today for Rebif monitoring.     2. Continue Rebif as ordered.     3. Continue vit D at the current dose.     4. F/u brain MRI in 6 months.     5. F/u with Dr. Mccall after brain MRI.

## 2019-09-06 NOTE — PROGRESS NOTES
[facility] OUTPATIENT MULTIPLE SCLEROSIS CLINIC VISIT NOTE      REASON FOR VISIT: Lydia is a 59 year old  who presents to the clinic today for regularly scheduled follow up of Relapsing Remitting MS. She was most recently seen by Dr. Mccall on 3/5/2019. She presents to the evaluation alone.     HISTORY OF PRESENT ILLNESS: Copied forward from my previous note. Please refer to previous clinic notes for detailed history.  In short, in 1987 patient had an episode of double vision.  She was seen by Ophthalmology.  She was diagnosed with MS after having an imaging study.  She also had a spinal tap.  She is unsure about the findings.  In 2005, her walking was affected.  She started seeing Dr. Vargas in 2006 and was started on Rebif. She continues to be on Rebif.     INTERVAL HISTORY SINCE LAST VISIT: Overall doing well. No recent hospitalization or illness. Patient denies any new changes in vision, balance, strength or sensation suggestive of new relapse of multiple sclerosis since he/she was last seen here.    She is currently on Rebif. Injections are going well. She reports mild injection site reactions as side effects from this medication.   Her blood counts and liver enzymes were stable in March 2019.    She denies any eye symptoms, facial numbness, weakness, dizziness, speech, swallowing or hearing issues.  She is right-handed.  Strength is equal in bilateral upper and lower extremities.  She denies numbness and tingling.  She reports ongoing issues with the balance but it has been staying the same for the past many years.  She do not use any assistive devices on a regular basis. Uses a scooter once a year when she goes to the [facility]. She reports mild fatigue.  She sleeps 7 hours at night.    For mild spasticity issues she takes baclofen 10 mg 2 times daily.  This was weaned down from 3 times a day dose.  She has been taking Ampyra at least since 2016. She reports mild frequency and urgency without any  "incontinence.  Denies major bowel issues.    8/2018  MRI Brain:  Impression:   1. The study demonstrates greater than 20 foci of T2-hyperintensity  within the cerebral white matter consistent with the clinical  suspicion of demyelinating disease. Intravenous contrast not utilized.     2. Moderate diffuse cerebral atrophy, unchanged since the prior.    Vit D: She currently takes 4000 international units daily and her most recent level from March 2018 was 91.    PHYSICAL EXAMINATION:  VITAL SIGNS: /64 (BP Location: Right arm, Patient Position: Chair, Cuff Size: Adult Regular)   Pulse 76   Ht 1.6 m (5' 3\")   Wt 50.7 kg (111 lb 11.2 oz)   BMI 19.79 kg/m     MENTAL STATUS: Alert,awake and  oriented times four.   CRANIAL NERVES:  Visual fields are full to confrontation.  The pupils are equal, round and reactive to light and there is no Albino Jasper pupil funduscopic examination demonstrates no optic pallor, or papilledema or retinal hemorrhage.  Extraocular movements are intact with no internuclear ophthalmoplegia.  No nystagmus.  Facial strength and sensation are normal.  Hearing is normal.  Palate elevation and tongue protrusion are normal.   POWER:  Strength is 5/5 in all extremities except for left lower extremity with hip flexors 4+/5, knee extensors 5/5, knee flexors 5/5, dorsiflexors 4+/5 on the left.   SENSORY:  Intact to light touch throughout.   MOTOR/CEREBELLAR:  There are no tremors or myoclonus or other abnormal movements.  Tone is slightly increased in her left leg.  There is no appendicular ataxia on finger-to-nose testing.  Rapid alternating movements are slow in her left foot.  There is no pronator drift.   GAIT:  She has a wide-based, spastic gait.      ASSESSMENT/ PLAN:   1. Relapsing remitting multiple sclerosis, on Rebif: Clinical exam remains stable compared to her previous exam.  She will continue Rebif for now follow-up with Dr. Mccall in spring 2020 after brain imaging.    2. Please " contact us with questions or concerns    Claudia Larsen CNP  [facility]          I spent 25 minutes with this patient today, greater than 50% of which was spent in counseling and coordination of care.     (Chart documentation was completed in part with Dragon voice-recognition software. Even though reviewed, some grammatical, spelling, and word errors may remain.)

## 2019-11-07 ENCOUNTER — HEALTH MAINTENANCE LETTER (OUTPATIENT)
Age: 59
End: 2019-11-07

## 2019-12-19 ENCOUNTER — MEDICAL CORRESPONDENCE (OUTPATIENT)
Dept: HEALTH INFORMATION MANAGEMENT | Facility: CLINIC | Age: 59
End: 2019-12-19

## 2019-12-23 ENCOUNTER — TELEPHONE (OUTPATIENT)
Dept: NEUROLOGY | Facility: CLINIC | Age: 59
End: 2019-12-23

## 2019-12-23 NOTE — TELEPHONE ENCOUNTER
Health Call Center    Phone Message    May a detailed message be left on voicemail: yes    Reason for Call: Medication Question or concern regarding medication   Prescription Request  Name of Medication: Rebif  Prescribing Provider: Dr. Mccall   Pharmacy: [facility] third party agency   What on the order needs clarification? Due to changes in Patients insurance [facility] will need to forward a new order/Rx of the Rebif.  Please fax to [phone]          Action Taken: Message routed to:  [facility] ([facility] neurology

## 2019-12-26 NOTE — TELEPHONE ENCOUNTER
Form previously received, signed and faxed back on 12/19/19; Form re-faxed.    Milana Lewis MS RN Care Coordinator

## 2020-01-27 DIAGNOSIS — G35 MULTIPLE SCLEROSIS (H): ICD-10-CM

## 2020-01-27 NOTE — TELEPHONE ENCOUNTER
Received refill request for Rebif from [facility]; Patient was last seen on 9/05/2019 and has follow up appointment on 3/10/2020 with Seamus. Pended to MS pool for review/approval    Brandy August MA

## 2020-01-27 NOTE — TELEPHONE ENCOUNTER
Previous Dr. Mccall patient, therefore, cannot sign prescription under Dr. Way; Pended to Dr. Way for review and signature, as the patient is scheduled to establish care with him in March.    Milana Lewis MS RN Care Coordinator

## 2020-02-26 ENCOUNTER — TELEPHONE (OUTPATIENT)
Dept: NEUROLOGY | Facility: CLINIC | Age: 60
End: 2020-02-26

## 2020-02-26 NOTE — TELEPHONE ENCOUNTER
Prior Authorization Approval    Authorization Effective Date: 2/26/2020  Authorization Expiration Date: 2/26/2021  Medication: Dalfampridine 10MG tablets ([place] APPROVED)  Approved Dose/Quantity: 60/30 days  Reference #:     Insurance Company: [employer] ([facility]) - Phone [phone] Fax [phone]  Expected CoPay:       CoPay Card Available:      Foundation Assistance Needed:    Which Pharmacy is filling the prescription (Not needed for infusion/clinic administered): [facility] - [place] - [facility] [address]  Pharmacy Notified:    Patient Notified:

## 2020-02-26 NOTE — TELEPHONE ENCOUNTER
PA Initiation    Medication: Dalfampridine 10MG tablets (PA RENEWAL)  Insurance Company: [employer] ([facility]) - Phone [phone] Fax [phone]  Pharmacy Filling the Rx: [facility] - [place], [place] - [address]  Filling Pharmacy Phone:    Filling Pharmacy Fax:    Start Date: 2/26/2020

## 2020-03-10 ENCOUNTER — OFFICE VISIT (OUTPATIENT)
Dept: NEUROLOGY | Facility: CLINIC | Age: 60
End: 2020-03-10
Attending: PSYCHIATRY & NEUROLOGY
Payer: COMMERCIAL

## 2020-03-10 ENCOUNTER — ANCILLARY PROCEDURE (OUTPATIENT)
Dept: MRI IMAGING | Facility: CLINIC | Age: 60
End: 2020-03-10
Attending: NURSE PRACTITIONER
Payer: COMMERCIAL

## 2020-03-10 VITALS
HEIGHT: 63 IN | SYSTOLIC BLOOD PRESSURE: 113 MMHG | HEART RATE: 69 BPM | DIASTOLIC BLOOD PRESSURE: 73 MMHG | BODY MASS INDEX: 19.79 KG/M2

## 2020-03-10 DIAGNOSIS — G35 MULTIPLE SCLEROSIS (H): ICD-10-CM

## 2020-03-10 RX ORDER — GADOBUTROL 604.72 MG/ML
7.5 INJECTION INTRAVENOUS ONCE
Status: COMPLETED | OUTPATIENT
Start: 2020-03-10 | End: 2020-03-10

## 2020-03-10 RX ADMIN — GADOBUTROL 5 ML: 604.72 INJECTION INTRAVENOUS at 12:38

## 2020-03-10 ASSESSMENT — PAIN SCALES - GENERAL: PAINLEVEL: NO PAIN (0)

## 2020-03-10 NOTE — LETTER
"3/10/2020       RE: Lydia William  [address] [place] [address]     Dear Colleague,    Thank you for referring your patient, Lydia William, to the [facility] MULTIPLE SCLEROSIS at [facility]. Please see a copy of my visit note below.    Service Date: 03/10/2020      REASON FOR VISIT:  Lydia William is a 59-year-old woman who I am meeting for the first time today for multiple sclerosis.  She previously followed with my colleagues  Olimpia Alba, and Sam.  She was last seen in clinic by Claudia Larsen in 09/2019.      HISTORY OF PRESENT ILLNESS:  The patient's neurologic history dates back to 1987, when she had an episode of double vision.  She began seeing Dr. Vargas in the mid 2000s and was started on Rebif in 2006.  She tells me she has not had any further relapses since starting the Rebif.  She does tell me that her walking has had subtle worsening over time, consistent with transition to the secondary progressive stage of the disease.  She does not feel there have been any changes in her walking over the past year, however.  She tells me an objective observer would notice that her walking was \"stiff.\"        Other residual symptoms include stiffness and clumsiness of the legs, occasional tingling in the arms and some bladder urgency.  She has bilateral footdrop.  She tried AFOs but found they did not help.  Vision is okay and cognitive function seems mainly intact, although she says her significant other thinks her memory has worsened.  She describes some unusual episodes of being \"paralyzed\" at night and being unable to move.  I suspect this is possibly sleep paralysis.      PHYSICAL EXAMINATION:  Blood pressure 113/73, pulse 69.  BMI 19.8.  She is alert and oriented.  Affect is bright and language functions are normal.  The cranial nerves are unremarkable.  Muscle bulk and tone are normal.  Strength in the arms is normal but hip flexion and knee flexion are weak " bilaterally at 4/5.  There is trace weakness of ankle dorsiflexion bilaterally, graded 5-/5.  Finger tapping, toe tapping and finger-nose-finger are normal.  Light touch is intact in the arms and legs.  She has a spastic, ataxic gait without hemiparetic component.  Tandem gait is mildly impaired and Romberg sign is moderately positive.      I reviewed medical records including office notes from Olimpia Alba and Sam.  We also reviewed her MRI done earlier today and compared it to the prior exam from 2018.  The MRI shows a heavy burden of T2 hyperintense lesions typical for MS.  These include radially oriented periventricular lesions, juxtacortical lesions and lesions throughout the brainstem and cerebellar white matter.  There is mild corpus callosum atrophy and moderate cerebral atrophy.  There were no new or enhancing lesions.      IMPRESSION:  Multiple sclerosis, relapsing onset, probably transitioned into the secondary progressive stage, but apparently stable over the past year on Rebif.        I spent 46 minutes with Lydia today, greater than 50% of which was spent in counseling and coordination of care and reviewing her MRIs together.  We primarily discussed the potential of stopping treatment.  I told her that the relapsing stage of MS eventually ends in all patients, usually by the end of the sixth decade of life.  The relapsing stage tends to last around 15-20 years or so and she has had MS for almost 35 years.  Interferon beta has shown some benefit in secondary progressive MS in some studies, and no benefit and others.  I told her I suspect the Rebif is not providing much benefit if any at this point, but I cannot prove that for certain.  Ultimately, we decided to continue it for now.  Barring evidence of new inflammatory activity, I would not plan on continuing that past her mid-60s at the latest.      PLAN:  She will follow up with me in 6 months.         EVELIO RAYMOND MD             D:  2020   T: 2020   MT: DC      Name:     ALAN JOHNSON   MRN:      [id number]        Account:      [id number]   :      1960           Service Date: 03/10/2020      Document: [id number]        Again, thank you for allowing me to participate in the care of your patient.      Sincerely,    Elijah Way MD

## 2020-03-10 NOTE — DISCHARGE INSTRUCTIONS
MRI Contrast Discharge Instructions    The IV contrast you received today will pass out of your body in your  urine. This will happen in the next 24 hours. You will not feel this process.  Your urine will not change color.    Drink at least 4 extra glasses of water or juice today (unless your doctor  has restricted your fluids). This reduces the stress on your kidneys.  You may take your regular medicines.    If you are on dialysis: It is best to have dialysis today.    If you have a reaction: Most reactions happen right away. If you have  any new symptoms after leaving the hospital (such as hives or swelling),  call your hospital at the correct number below. Or call your family doctor.  If you have breathing distress or wheezing, call 911.    Special instructions: ***    I have read and understand the above information.    Signature:______________________________________ Date:___________    Staff:__________________________________________ Date:___________     Time:__________    [facility] Departments:    ___[place]: [phone]  ___[facility]: [phone]  ___[place]: [phone] ___[facility]: [phone]  ___[facility]: [phone]  ___[facility]: [phone]  ___[phone]  ___[facility]: [phone]  ___[phone]

## 2020-03-10 NOTE — LETTER
"3/10/2020      RE: Lydia William  [address] [place]   [place] [address]       Service Date: 03/10/2020      REASON FOR VISIT:  Lydia William is a 59-year-old woman who I am meeting for the first time today for multiple sclerosis.  She previously followed with my colleagues  Olimpia Alba, and Sam.  She was last seen in clinic by Claudia Larsen in 09/2019.      HISTORY OF PRESENT ILLNESS:  The patient's neurologic history dates back to 1987, when she had an episode of double vision.  She began seeing Dr. Vargas in the mid 2000s and was started on Rebif in 2006.  She tells me she has not had any further relapses since starting the Rebif.  She does tell me that her walking has had subtle worsening over time, consistent with transition to the secondary progressive stage of the disease.  She does not feel there have been any changes in her walking over the past year, however.  She tells me an objective observer would notice that her walking was \"stiff.\"        Other residual symptoms include stiffness and clumsiness of the legs, occasional tingling in the arms and some bladder urgency.  She has bilateral footdrop.  She tried AFOs but found they did not help.  Vision is okay and cognitive function seems mainly intact, although she says her significant other thinks her memory has worsened.  She describes some unusual episodes of being \"paralyzed\" at night and being unable to move.  I suspect this is possibly sleep paralysis.      PHYSICAL EXAMINATION:  Blood pressure 113/73, pulse 69.  BMI 19.8.  She is alert and oriented.  Affect is bright and language functions are normal.  The cranial nerves are unremarkable.  Muscle bulk and tone are normal.  Strength in the arms is normal but hip flexion and knee flexion are weak bilaterally at 4/5.  There is trace weakness of ankle dorsiflexion bilaterally, graded 5-/5.  Finger tapping, toe tapping and finger-nose-finger are normal.  Light touch is intact in the arms and " legs.  She has a spastic, ataxic gait without hemiparetic component.  Tandem gait is mildly impaired and Romberg sign is moderately positive.      I reviewed medical records including office notes from Olimpia Alba and Sam.  We also reviewed her MRI done earlier today and compared it to the prior exam from 2018.  The MRI shows a heavy burden of T2 hyperintense lesions typical for MS.  These include radially oriented periventricular lesions, juxtacortical lesions and lesions throughout the brainstem and cerebellar white matter.  There is mild corpus callosum atrophy and moderate cerebral atrophy.  There were no new or enhancing lesions.      IMPRESSION:  Multiple sclerosis, relapsing onset, probably transitioned into the secondary progressive stage, but apparently stable over the past year on Rebif.        I spent 46 minutes with Alan today, greater than 50% of which was spent in counseling and coordination of care and reviewing her MRIs together.  We primarily discussed the potential of stopping treatment.  I told her that the relapsing stage of MS eventually ends in all patients, usually by the end of the sixth decade of life.  The relapsing stage tends to last around 15-20 years or so and she has had MS for almost 35 years.  Interferon beta has shown some benefit in secondary progressive MS in some studies, and no benefit and others.  I told her I suspect the Rebif is not providing much benefit if any at this point, but I cannot prove that for certain.  Ultimately, we decided to continue it for now.  Barring evidence of new inflammatory activity, I would not plan on continuing that past her mid-60s at the latest.      PLAN:  She will follow up with me in 6 months.         EVELIO RAYMOND MD             D: 2020   T: 2020   MT:       Name:     ALAN JOHNSON   MRN:      [id number]        Account:      [id number]   :      1960           Service Date: 03/10/2020      Document:  [id number]        Elijah Way MD

## 2020-03-11 DIAGNOSIS — G35 MULTIPLE SCLEROSIS (H): ICD-10-CM

## 2020-03-11 NOTE — TELEPHONE ENCOUNTER
Received refill request for D[facility]AMPRIDINE [facility]; Patient was last seen on 3/10/2020 and has follow up appointment on 9/15/2020 with Dr Way. Pended to MS pool for review/approval    Brandy August MA

## 2020-03-12 RX ORDER — DALFAMPRIDINE 10 MG/1
10 TABLET, FILM COATED, EXTENDED RELEASE ORAL 2 TIMES DAILY
Qty: 180 TABLET | Refills: 3 | Status: SHIPPED | OUTPATIENT
Start: 2020-03-12 | End: 2021-03-01

## 2020-03-12 NOTE — TELEPHONE ENCOUNTER
[facility] Call Center    Phone Message    May a detailed message be left on voicemail: yes     Reason for Call: Medication Refill Request    Has the patient contacted the pharmacy for the refill? Yes   Name of medication being requested: Dalfampridine 10 MG TB12   Provider who prescribed the medication: Dr. Mccall  Pharmacy: [facility] - [place], [place] [address]  Date medication is needed: 3/12/20   Pharmacy calling to check on an order for Dalfampridine 10 MG TB12 that Lydia had told them was sent on 3/11/20. The pharmacy was told by the patient this would be coming from Dr. Way. Please give Lydia a call if there are any questions regarding the request.      Action Taken: Message routed to:  [facility]    Travel Screening: Not Applicable

## 2020-03-13 NOTE — PROGRESS NOTES
"Service Date: 03/10/2020      REASON FOR VISIT:  Lydia William is a 59-year-old woman who I am meeting for the first time today for multiple sclerosis.  She previously followed with my colleagues  Olimpia Alba, and Sam.  She was last seen in clinic by Claudia Larsen in 09/2019.      HISTORY OF PRESENT ILLNESS:  The patient's neurologic history dates back to 1987, when she had an episode of double vision.  She began seeing Dr. Vargas in the mid 2000s and was started on Rebif in 2006.  She tells me she has not had any further relapses since starting the Rebif.  She does tell me that her walking has had subtle worsening over time, consistent with transition to the secondary progressive stage of the disease.  She does not feel there have been any changes in her walking over the past year, however.  She tells me an objective observer would notice that her walking was \"stiff.\"        Other residual symptoms include stiffness and clumsiness of the legs, occasional tingling in the arms and some bladder urgency.  She has bilateral footdrop.  She tried AFOs but found they did not help.  Vision is okay and cognitive function seems mainly intact, although she says her significant other thinks her memory has worsened.  She describes some unusual episodes of being \"paralyzed\" at night and being unable to move.  I suspect this is possibly sleep paralysis.      PHYSICAL EXAMINATION:  Blood pressure 113/73, pulse 69.  BMI 19.8.  She is alert and oriented.  Affect is bright and language functions are normal.  The cranial nerves are unremarkable.  Muscle bulk and tone are normal.  Strength in the arms is normal but hip flexion and knee flexion are weak bilaterally at 4/5.  There is trace weakness of ankle dorsiflexion bilaterally, graded 5-/5.  Finger tapping, toe tapping and finger-nose-finger are normal.  Light touch is intact in the arms and legs.  She has a spastic, ataxic gait without hemiparetic component.  Tandem gait is " mildly impaired and Romberg sign is moderately positive.      I reviewed medical records including office notes from Olimpia Alba and Sam.  We also reviewed her MRI done earlier today and compared it to the prior exam from 2018.  The MRI shows a heavy burden of T2 hyperintense lesions typical for MS.  These include radially oriented periventricular lesions, juxtacortical lesions and lesions throughout the brainstem and cerebellar white matter.  There is mild corpus callosum atrophy and moderate cerebral atrophy.  There were no new or enhancing lesions.      IMPRESSION:  Multiple sclerosis, relapsing onset, probably transitioned into the secondary progressive stage, but apparently stable over the past year on Rebif.        I spent 46 minutes with Alan today, greater than 50% of which was spent in counseling and coordination of care and reviewing her MRIs together.  We primarily discussed the potential of stopping treatment.  I told her that the relapsing stage of MS eventually ends in all patients, usually by the end of the sixth decade of life.  The relapsing stage tends to last around 15-20 years or so and she has had MS for almost 35 years.  Interferon beta has shown some benefit in secondary progressive MS in some studies, and no benefit and others.  I told her I suspect the Rebif is not providing much benefit if any at this point, but I cannot prove that for certain.  Ultimately, we decided to continue it for now.  Barring evidence of new inflammatory activity, I would not plan on continuing that past her mid-60s at the latest.      PLAN:  She will follow up with me in 6 months.         EVELIO RAYMOND MD             D: 2020   T: 2020   MT: DC      Name:     ALAN JOHNSON   MRN:      [id number]        Account:      [id number]   :      1960           Service Date: 03/10/2020      Document: [id number]

## 2020-04-09 ENCOUNTER — NURSE TRIAGE (OUTPATIENT)
Dept: NURSING | Facility: CLINIC | Age: 60
End: 2020-04-09

## 2020-04-09 NOTE — TELEPHONE ENCOUNTER
I talked with Dr. Way, and he said that she should be fine, and to just not take any more of the medication today; I called Lydia back and let her know this; She is tearful and very worried; We agreed that I would call her back again around 1pm to check in on her.    Milana Lewis, MS RN Care Coordinator

## 2020-04-09 NOTE — TELEPHONE ENCOUNTER
59 year old female with MS.  She is calling because she took a dose of Dalfampridine 10 mg at 8:30am this morning and accidentally took another dose at 10:10am.  She denies nausea, dizziness headache or weakness     Will forward to neurology team to call back with recommendations

## 2020-04-09 NOTE — TELEPHONE ENCOUNTER
"I called Lydia again to check in how she is doing; She tells me she feels just fine, and is just holding off on taking any of her other medications/vitamins; She is drinking a lot of water and \"taking it easy\" around the house; She will let us know if she needs anything else.    Milana Lewis, MS RN Care Coordinator    "

## 2020-07-02 ENCOUNTER — MEDICAL CORRESPONDENCE (OUTPATIENT)
Dept: HEALTH INFORMATION MANAGEMENT | Facility: CLINIC | Age: 60
End: 2020-07-02

## 2020-07-02 ENCOUNTER — TELEPHONE (OUTPATIENT)
Dept: NEUROLOGY | Facility: CLINIC | Age: 60
End: 2020-07-02

## 2020-07-02 NOTE — TELEPHONE ENCOUNTER
[facility] Call Center    Phone Message    May a detailed message be left on voicemail: yes     Reason for Call: Other: Please call Pete back. Thank you.     Action Taken: Message routed to:  [facility]    Travel Screening: Not Applicable

## 2020-07-02 NOTE — TELEPHONE ENCOUNTER
Form signed by Claudia Larsen earlier today and faxed in recently.    Milana Lewis, MS RN Care Coordinator

## 2020-07-28 ENCOUNTER — TELEPHONE (OUTPATIENT)
Dept: NEUROLOGY | Facility: CLINIC | Age: 60
End: 2020-07-28

## 2020-07-28 NOTE — TELEPHONE ENCOUNTER
Prior Authorization Specialty Medication Request    Medication/Dose: Rebif 44mcg  ICD code (if different than what is on RX):  Relapsing Remitting Multiple Sclerosis, G35  Previously Tried and Failed:  None    Important Lab Values: n/a  Rationale: Continuation of current disease modifying therapy for demyelinating disease, currently clinically stable on, please approve.    Insurance Name: [employer]  Insurance ID: [id number]  Insurance Phone Number: [phone]    Pharmacy Information (if different than what is on RX)  Name:  n/a  Phone:  N/a    CMWEN Key: [id number]

## 2020-07-29 NOTE — TELEPHONE ENCOUNTER
PA Initiation    Medication: Rebif 44MCG/0.5ML Syringes (PENDING)  Insurance Company: [employer] ([facility]) - Phone [phone] Fax [phone]  Pharmacy Filling the Rx: [facility] - [place], [place] - [address]  Filling Pharmacy Phone: [phone]  Filling Pharmacy Fax: [phone]  Start Date: 7/29/2020

## 2020-07-30 NOTE — TELEPHONE ENCOUNTER
Prior Authorization Approval    Authorization Effective Date: 7/29/2020  Authorization Expiration Date: 7/29/2021  Medication: Rebif 44MCG/0.5ML Syringes (APPROVED)  Approved Dose/Quantity: 6ML/28 days  Reference #:     Insurance Company: [employer] ([facility]) - Phone [phone] Fax [phone]  Expected CoPay:       CoPay Card Available:      Foundation Assistance Needed:    Which Pharmacy is filling the prescription (Not needed for infusion/clinic administered): [facility] - [place], [place] - [address]  Pharmacy Notified: Yes  Patient Notified: Yes

## 2020-09-15 ENCOUNTER — OFFICE VISIT (OUTPATIENT)
Dept: NEUROLOGY | Facility: CLINIC | Age: 60
End: 2020-09-15
Attending: PSYCHIATRY & NEUROLOGY
Payer: COMMERCIAL

## 2020-09-15 VITALS
WEIGHT: 108.4 LBS | HEIGHT: 63 IN | HEART RATE: 86 BPM | DIASTOLIC BLOOD PRESSURE: 68 MMHG | SYSTOLIC BLOOD PRESSURE: 106 MMHG | BODY MASS INDEX: 19.21 KG/M2

## 2020-09-15 DIAGNOSIS — G35 MS (MULTIPLE SCLEROSIS) (H): Primary | ICD-10-CM

## 2020-09-15 PROCEDURE — G0463 HOSPITAL OUTPT CLINIC VISIT: HCPCS | Mod: ZF

## 2020-09-15 ASSESSMENT — PAIN SCALES - GENERAL: PAINLEVEL: NO PAIN (0)

## 2020-09-15 ASSESSMENT — MIFFLIN-ST. JEOR: SCORE: 1030.83

## 2020-09-15 NOTE — Clinical Note
9/15/2020       RE: Lydia William  [address] [place] [address]     Dear Colleague,    Thank you for referring your patient, Lydia William, to the [facility] MULTIPLE SCLEROSIS at [facility]. Please see a copy of my visit note below.    Service Date: 09/15/2020      REASON FOR VISIT:  Lydia William is a 59-year-old woman who I follow for MS.  She returns for routine followup.  I last saw her in 03/2020.        HISTORY OF PRESENT ILLNESS:  Lydia has had MS for over 30 years.  She tells me that there has not been any change since I saw her last spring.  She had a subtle deterioration in her walking consistent with transition to the secondary progressive stage of MS, but her walking has been stable in recent years.  The legs are somewhat stiff and clumsy and she has some footdrop.  She gets some evanescent tingling in the limbs and some bladder urgency but motor function in the arms is still fine.  Speech swallowing and vision is normal.  She continues to take Rebif, which she has been on for many years.  She has not had any significant new non-neurologic medical issues since I last saw her.      PHYSICAL EXAMINATION:  Blood pressure 106/68.  Pulse 86.  Weight 108 pounds.  She is alert and oriented.  Affect is bright and language functions are normal.  Cranial nerves are unremarkable.  Muscle bulk and tone are normal.  Strength in the arms is normal, but hip flexion is weak bilaterally at 4/5.  Knee flexion strength is normal.  Finger tapping is normal, but toe tapping is slow and clumsy.  Deep tendon reflexes are 2/4 and symmetric at the biceps and 3/4 and symmetric at the knees.  She has a wide-based, spastic and ataxic gait without hemiparetic component.  Tandem gait is moderately impaired and Romberg sign is moderately positive.      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      I spent 36 minutes with Lydia and her , greater than 50% of  which was spent in counseling and coordination of care.        We primarily discussed treatment going forward.  She asked about ocrelizumab and I discussed that as well as the newer anti-CD20 antibody, ofatumumab.  I told her that I do not think that they would provide any additional benefit since she has been stable on Rebif.  They would also entail new risks given the COVID-19 pandemic.        Regarding the Rebif, I told her I rather doubt it is still helping her after having MS for more than 3 decades.  She is in her 60s, now it is very unlikely, but not impossible that she is still in the relapsing stage of the disease.  There are inconsistent results as to whether interferon provides any benefit for secondary progressive MS and the absence of relapses, but it is possible that her clinical stability is attributable in part to the Rebif.  I told her that stopping it and being on no immunotherapy is an option as it is continuing unchanged for now.  I would not recommend switching to a different medication.  I told her Rebif I would consider to be safe in the COVID-19 pandemic and it has actually been investigated as a treatment for COVID-19.        Finally, we discussed an option of gradually reducing the dose of that.  This could take the form of going down to 2 injections a week instead of 3, and if stable on that regimen for a year or so, we could go down to a single injection a week and then discontinue.  Ultimately that is what I recommended.        She had not made any particular firm decision, but I think the options are either to decrease the frequency or remain on it for now.      PLAN:  Follow up in 1 year.         EVELIO RAYMOND MD             D: 2020   T: 2020   MT: DC      Name:     ALAN JOHNSON   MRN:      [id number]        Account:      [id number]   :      1960           Service Date: 09/15/2020      Document: [id number]       Again, thank you for allowing me to  participate in the care of your patient.      Sincerely,    Elijah Way MD

## 2020-09-15 NOTE — LETTER
9/15/2020       RE: Lydia William  [address] [place] [address]     Dear Colleague,    Thank you for referring your patient, Lydia William, to the [facility] MULTIPLE SCLEROSIS at [facility]. Please see a copy of my visit note below.    Service Date: 09/15/2020      REASON FOR VISIT:  Lydia William is a 59-year-old woman who I follow for MS.  She returns for routine followup.  I last saw her in 03/2020.        HISTORY OF PRESENT ILLNESS:  Lydia has had MS for over 30 years.  She tells me that there has not been any change since I saw her last spring.  She had a subtle deterioration in her walking consistent with transition to the secondary progressive stage of MS, but her walking has been stable in recent years.  The legs are somewhat stiff and clumsy and she has some footdrop.  She gets some evanescent tingling in the limbs and some bladder urgency but motor function in the arms is still fine.  Speech swallowing and vision is normal.  She continues to take Rebif, which she has been on for many years.  She has not had any significant new non-neurologic medical issues since I last saw her.      PHYSICAL EXAMINATION:  Blood pressure 106/68.  Pulse 86.  Weight 108 pounds.  She is alert and oriented.  Affect is bright and language functions are normal.  Cranial nerves are unremarkable.  Muscle bulk and tone are normal.  Strength in the arms is normal, but hip flexion is weak bilaterally at 4/5.  Knee flexion strength is normal.  Finger tapping is normal, but toe tapping is slow and clumsy.  Deep tendon reflexes are 2/4 and symmetric at the biceps and 3/4 and symmetric at the knees.  She has a wide-based, spastic and ataxic gait without hemiparetic component.  Tandem gait is moderately impaired and Romberg sign is moderately positive.      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      I spent 36 minutes with Lydia and her , greater than 50% of  which was spent in counseling and coordination of care.        We primarily discussed treatment going forward.  She asked about ocrelizumab and I discussed that as well as the newer anti-CD20 antibody, ofatumumab.  I told her that I do not think that they would provide any additional benefit since she has been stable on Rebif.  They would also entail new risks given the COVID-19 pandemic.        Regarding the Rebif, I told her I rather doubt it is still helping her after having MS for more than 3 decades.  She is in her 60s, now it is very unlikely, but not impossible that she is still in the relapsing stage of the disease.  There are inconsistent results as to whether interferon provides any benefit for secondary progressive MS and the absence of relapses, but it is possible that her clinical stability is attributable in part to the Rebif.  I told her that stopping it and being on no immunotherapy is an option as it is continuing unchanged for now.  I would not recommend switching to a different medication.  I told her Rebif I would consider to be safe in the COVID-19 pandemic and it has actually been investigated as a treatment for COVID-19.        Finally, we discussed an option of gradually reducing the dose of that.  This could take the form of going down to 2 injections a week instead of 3, and if stable on that regimen for a year or so, we could go down to a single injection a week and then discontinue.  Ultimately that is what I recommended.        She had not made any particular firm decision, but I think the options are either to decrease the frequency or remain on it for now.      PLAN:  Follow up in 1 year.      Again, thank you for allowing me to participate in the care of your patient.  Sincerely,       EVELIO RAYMOND MD  D: 2020   T: 2020   MT: DC      Name:     ALAN JOHNSON   MRN:      [id number]        Account:      [id number]   :      1960           Service Date:  09/15/2020      Document: [id number]

## 2020-09-16 NOTE — PROGRESS NOTES
Service Date: 09/15/2020      REASON FOR VISIT:  Lydia William is a 59-year-old woman who I follow for MS.  She returns for routine followup.  I last saw her in 03/2020.        HISTORY OF PRESENT ILLNESS:  Lydia has had MS for over 30 years.  She tells me that there has not been any change since I saw her last spring.  She had a subtle deterioration in her walking consistent with transition to the secondary progressive stage of MS, but her walking has been stable in recent years.  The legs are somewhat stiff and clumsy and she has some footdrop.  She gets some evanescent tingling in the limbs and some bladder urgency but motor function in the arms is still fine.  Speech swallowing and vision is normal.  She continues to take Rebif, which she has been on for many years.  She has not had any significant new non-neurologic medical issues since I last saw her.      PHYSICAL EXAMINATION:  Blood pressure 106/68.  Pulse 86.  Weight 108 pounds.  She is alert and oriented.  Affect is bright and language functions are normal.  Cranial nerves are unremarkable.  Muscle bulk and tone are normal.  Strength in the arms is normal, but hip flexion is weak bilaterally at 4/5.  Knee flexion strength is normal.  Finger tapping is normal, but toe tapping is slow and clumsy.  Deep tendon reflexes are 2/4 and symmetric at the biceps and 3/4 and symmetric at the knees.  She has a wide-based, spastic and ataxic gait without hemiparetic component.  Tandem gait is moderately impaired and Romberg sign is moderately positive.      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      I spent 36 minutes with Lydia and her , greater than 50% of which was spent in counseling and coordination of care.        We primarily discussed treatment going forward.  She asked about ocrelizumab and I discussed that as well as the newer anti-CD20 antibody, ofatumumab.  I told her that I do not think that they would provide any additional  benefit since she has been stable on Rebif.  They would also entail new risks given the COVID-19 pandemic.        Regarding the Rebif, I told her I rather doubt it is still helping her after having MS for more than 3 decades.  She is in her 60s, now it is very unlikely, but not impossible that she is still in the relapsing stage of the disease.  There are inconsistent results as to whether interferon provides any benefit for secondary progressive MS and the absence of relapses, but it is possible that her clinical stability is attributable in part to the Rebif.  I told her that stopping it and being on no immunotherapy is an option as it is continuing unchanged for now.  I would not recommend switching to a different medication.  I told her Rebif I would consider to be safe in the COVID-19 pandemic and it has actually been investigated as a treatment for COVID-19.        Finally, we discussed an option of gradually reducing the dose of that.  This could take the form of going down to 2 injections a week instead of 3, and if stable on that regimen for a year or so, we could go down to a single injection a week and then discontinue.  Ultimately that is what I recommended.        She had not made any particular firm decision, but I think the options are either to decrease the frequency or remain on it for now.      PLAN:  Follow up in 1 year.         EVELIO RAYMOND MD             D: 2020   T: 2020   MT: DC      Name:     ALAN JOHNSON   MRN:      [id number]        Account:      [id number]   :      1960           Service Date: 09/15/2020      Document: [id number]

## 2020-11-29 ENCOUNTER — HEALTH MAINTENANCE LETTER (OUTPATIENT)
Age: 60
End: 2020-11-29

## 2021-02-01 DIAGNOSIS — G35 MULTIPLE SCLEROSIS (H): ICD-10-CM

## 2021-02-01 RX ORDER — INTERFERON BETA-1A 44 UG/.5ML
INJECTION, SOLUTION SUBCUTANEOUS
Qty: 6 ML | Refills: 11 | Status: SHIPPED | OUTPATIENT
Start: 2021-02-01 | End: 2021-12-16

## 2021-02-01 NOTE — TELEPHONE ENCOUNTER
[facility]; Patient was last seen in September and has follow up appointment in September with Dr. Way; Refilled per MS refill protocol.    Milana Lewis MS RN Care Coordinator

## 2021-03-01 ENCOUNTER — TELEPHONE (OUTPATIENT)
Dept: NEUROLOGY | Facility: CLINIC | Age: 61
End: 2021-03-01

## 2021-03-01 DIAGNOSIS — G35 MULTIPLE SCLEROSIS (H): ICD-10-CM

## 2021-03-01 RX ORDER — DALFAMPRIDINE 10 MG/1
TABLET, FILM COATED, EXTENDED RELEASE ORAL
Qty: 60 TABLET | Refills: 11 | Status: SHIPPED | OUTPATIENT
Start: 2021-03-01 | End: 2021-09-21

## 2021-03-01 NOTE — TELEPHONE ENCOUNTER
Prior Authorization Approval    Authorization Effective Date: 3/1/2021  Authorization Expiration Date: 9/1/2021  Medication: Dalfampridine 10MG er tablets (APPROVED)  Approved Dose/Quantity: 60/30 days  Reference #:     Insurance Company: Christine ([facility]) - Phone [phone] Fax [phone]  Expected CoPay:       CoPay Card Available: No    Foundation Assistance Needed:    Which Pharmacy is filling the prescription (Not needed for infusion/clinic administered): [facility] - [place], [place] - [facility] [address]  Pharmacy Notified: Yes  Patient Notified: No

## 2021-03-01 NOTE — TELEPHONE ENCOUNTER
Received refill request for [facility]ampridine from [facility]; Patient was last seen in September and has follow up appointment in September with Dr. Way; Refilled per MS refill protocol.    Milana Lewis MS RN Care Coordinator

## 2021-03-01 NOTE — TELEPHONE ENCOUNTER
PA Initiation    Medication: Dalfampridine 10MG er tablets (PENDING)  Insurance Company: [employer] ([facility]) - Phone [phone] Fax [phone]  Pharmacy Filling the Rx: [facility] - [place], [place] - [facility] [address]  Filling Pharmacy Phone:    Filling Pharmacy Fax:    Start Date: 3/1/2021        Thank you,    Dominique Lemon [facility]  Specialty Pharmacy Clinic [facility] Surgery [address] [address]  Ph: [phone] Fax: [phone]  [email or web address]

## 2021-03-18 ENCOUNTER — IMMUNIZATION (OUTPATIENT)
Dept: NURSING | Facility: CLINIC | Age: 61
End: 2021-03-18
Payer: COMMERCIAL

## 2021-03-18 PROCEDURE — 0011A PR COVID VAC MODERNA 100 MCG/0.5 ML IM: CPT

## 2021-03-18 PROCEDURE — 91301 PR COVID VAC MODERNA 100 MCG/0.5 ML IM: CPT

## 2021-03-23 DIAGNOSIS — G35 MULTIPLE SCLEROSIS (H): ICD-10-CM

## 2021-03-23 RX ORDER — BACLOFEN 10 MG/1
10 TABLET ORAL 3 TIMES DAILY
Qty: 270 TABLET | Refills: 3 | Status: SHIPPED | OUTPATIENT
Start: 2021-03-23 | End: 2021-09-21

## 2021-03-23 NOTE — TELEPHONE ENCOUNTER
Received refill request for BACLOFEN from [facility]; Patient was last seen on 09/15/2020 and has follow up appointment on 09/21/2021 with davy. Pended to MS walsh for review/approval    Brandy August MA

## 2021-04-15 ENCOUNTER — IMMUNIZATION (OUTPATIENT)
Dept: NURSING | Facility: CLINIC | Age: 61
End: 2021-04-15
Attending: INTERNAL MEDICINE
Payer: COMMERCIAL

## 2021-04-15 PROCEDURE — 91301 PR COVID VAC MODERNA 100 MCG/0.5 ML IM: CPT

## 2021-04-15 PROCEDURE — 0012A PR COVID VAC MODERNA 100 MCG/0.5 ML IM: CPT

## 2021-07-26 ENCOUNTER — TELEPHONE (OUTPATIENT)
Dept: NEUROLOGY | Facility: CLINIC | Age: 61
End: 2021-07-26
Payer: COMMERCIAL

## 2021-07-26 NOTE — TELEPHONE ENCOUNTER
Prior Authorization Specialty Medication Request    Medication/Dose: Rebif 44mcg  ICD code (if different than what is on RX):  Multiple sclerosis, G35  Previously Tried and Failed:  None    Important Lab Values: n/a  Rationale: Continuation of current disease modifying therapy for demyelinating disease, currently clinically stable on, please approve.    Insurance Name: [employer]  Insurance ID: [id number]  Insurance Phone Number: n/a    Pharmacy Information (if different than what is on RX)  Name:  Joe  Phone:  [phone]

## 2021-07-26 NOTE — TELEPHONE ENCOUNTER
PA Initiation    Medication: Rebif 44MCG/0.5ML Syringes (PENDING)  Insurance Company: [employer] ([facility]) - Phone [phone] Fax [phone]  Pharmacy Filling the Rx: [facility] - [place], [place] - [address]  Filling Pharmacy Phone:    Filling Pharmacy Fax:    Start Date: 7/26/2021        Thank you,    Dominique Lemon [facility]  Specialty Pharmacy Clinic [facility] Surgery [address]  [address] [place] [address]  Ph: [phone] Fax: [phone]  [email or web address]

## 2021-07-27 NOTE — TELEPHONE ENCOUNTER
Prior Authorization Approval    Authorization Effective Date: 7/26/2021  Authorization Expiration Date: 7/26/2022  Medication: Rebif 44MCG/0.5ML Syringes (APPROVED)  Approved Dose/Quantity: 28 days  Reference #:     Insurance Company: [employer]LOR ([facility]) - Phone [phone] Fax [phone]  Expected CoPay:       CoPay Card Available: Yes    Foundation Assistance Needed:    Which Pharmacy is filling the prescription (Not needed for infusion/clinic administered): [facility] [place] [address] [facility]  Pharmacy Notified: Yes  Patient Notified: Yes          Thank you,    Dominique Lemon [facility]  Specialty Pharmacy Clinic [facility] Surgery [address] [address]  Ph: [phone] Fax: [phone]  [email or web address]     No

## 2021-09-21 ENCOUNTER — LAB (OUTPATIENT)
Dept: LAB | Facility: CLINIC | Age: 61
End: 2021-09-21
Payer: COMMERCIAL

## 2021-09-21 ENCOUNTER — OFFICE VISIT (OUTPATIENT)
Dept: NEUROLOGY | Facility: CLINIC | Age: 61
End: 2021-09-21
Attending: PSYCHIATRY & NEUROLOGY
Payer: COMMERCIAL

## 2021-09-21 VITALS
OXYGEN SATURATION: 98 % | WEIGHT: 109.7 LBS | DIASTOLIC BLOOD PRESSURE: 70 MMHG | SYSTOLIC BLOOD PRESSURE: 110 MMHG | BODY MASS INDEX: 19.43 KG/M2 | HEART RATE: 82 BPM

## 2021-09-21 DIAGNOSIS — G35 MULTIPLE SCLEROSIS (H): ICD-10-CM

## 2021-09-21 DIAGNOSIS — G35 MULTIPLE SCLEROSIS (H): Primary | ICD-10-CM
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
## 2021-09-21 LAB
ALT SERPL W P-5'-P-CCNC: 26 U/L (ref 0–50)
AST SERPL W P-5'-P-CCNC: 23 U/L (ref 0–45)
ERYTHROCYTE [DISTWIDTH] IN BLOOD BY AUTOMATED COUNT: 12.6 % (ref 10–15)
HCT VFR BLD AUTO: 36.8 % (ref 35–47)
HGB BLD-MCNC: 12.3 G/DL (ref 11.7–15.7)
MCH RBC QN AUTO: 30.2 PG (ref 26.5–33)
MCHC RBC AUTO-ENTMCNC: 33.4 G/DL (ref 31.5–36.5)
MCV RBC AUTO: 90 FL (ref 78–100)
PLATELET # BLD AUTO: 166 10E3/UL (ref 150–450)
RBC # BLD AUTO: 4.07 10E6/UL (ref 3.8–5.2)
WBC # BLD AUTO: 5.1 10E3/UL (ref 4–11)

## 2021-09-21 PROCEDURE — 99215 OFFICE O/P EST HI 40 MIN: CPT | Performed by: PSYCHIATRY & NEUROLOGY

## 2021-09-21 PROCEDURE — 84450 TRANSFERASE (AST) (SGOT): CPT | Performed by: PATHOLOGY

## 2021-09-21 PROCEDURE — G0463 HOSPITAL OUTPT CLINIC VISIT: HCPCS

## 2021-09-21 PROCEDURE — 36415 COLL VENOUS BLD VENIPUNCTURE: CPT | Performed by: PATHOLOGY

## 2021-09-21 PROCEDURE — 84460 ALANINE AMINO (ALT) (SGPT): CPT | Performed by: PATHOLOGY

## 2021-09-21 PROCEDURE — 85027 COMPLETE CBC AUTOMATED: CPT | Performed by: PATHOLOGY

## 2021-09-21 RX ORDER — BACLOFEN 10 MG/1
10 TABLET ORAL 3 TIMES DAILY
Qty: 270 TABLET | Refills: 3 | Status: SHIPPED | OUTPATIENT
Start: 2021-09-21 | End: 2022-09-27

## 2021-09-21 RX ORDER — DALFAMPRIDINE 10 MG/1
TABLET, FILM COATED, EXTENDED RELEASE ORAL
Qty: 60 TABLET | Refills: 11 | Status: SHIPPED | OUTPATIENT
Start: 2021-09-21 | End: 2022-09-15

## 2021-09-21 ASSESSMENT — PAIN SCALES - GENERAL: PAINLEVEL: NO PAIN (0)

## 2021-09-21 NOTE — LETTER
"9/21/2021       RE: Lydia William  [address] [place] [address]     Dear Colleague,    Thank you for referring your patient, Lydia William, to the [facility] MULTIPLE SCLEROSIS CLINIC [place] at [facility]. Please see a copy of my visit note below.    Service Date: 09/21/2021    REASON FOR VISIT:  Lydia William is a 61-year-old woman who I follow for MS.  She returns for routine followup.  I last saw her 1 year ago.    HISTORY OF PRESENT ILLNESS:  Lydia had a fall in May where she fractured and dislocated her left elbow requiring surgery.  It was a hot day and she attributes the fall to that, basically just tripped.  She does have bilateral foot drop, but did not tolerate ankle foot orthotics.  Both she and her  agree that her walking has been stable and her  notes ironically that just prior to the fall he had been complimenting her on how much better her walking appeared.  She does not have any numbness or tingling, no symptoms from the neck up and motor function in the arms is fine.  Both legs are a bit weak and clumsy.  She continues to take Rebif and tolerates it well.  She takes the injections around 8:00 p.m.  She does note that she had a pretty severe reaction to the second COVID-19 vaccine, and her  says she was \"pretty much paralyzed\" for about a day.  Lydia recalls staying in bed most of the day and then getting up for dinner and feeling her normal self.    PHYSICAL EXAMINATION:  Blood pressure 110/70, pulse 82, weight 109 pounds.  She is alert and oriented.  Affect is bright and language functions are normal.  Cranial nerves are unremarkable.  Muscle bulk and tone are normal.  Strength in the arms is normal, but hip flexion is weak at 4/5 bilaterally.  Knee flexion strength is normal as is ankle dorsiflexion.  Light touch is intact throughout.  Reflexes are pathologically brisk and symmetric.  Gait is wide-based and " "somewhat spastic without any hemiparetic component.  She is unable to walk on the heels.  I did not attempt to have her walk in tandem, but I am sure that would be impossible.  Romberg sign is mildly positive.    IMPRESSION:  Multiple sclerosis, most likely secondary progressive stage, stable on interferon beta.  I spent 42 minutes on Lydia's care today, which included chart review, face-to-face and documentation time.  She had several questions regarding vaccines including COVID boosters and the shingles vaccine, and her  asked about any sort of backlash risk for her after the pandemic is over after everybody wearing masks and social distancing.  I told him about the \"hygiene hypothesis,\" but how there is really no data strongly supporting that and I do not think any \"immunological backlash\" is likely.  We mainly discussed the question of how long to continue Rebif.  I told her that I think the likelihood it is helping her is rather low, but not zero and ultimately they preferred to keep it going up through her mid 60s, which is reasonable.    PLAN:    1.  AST, ALT, CBC today.  2.  Follow up in 1 year.    Elijah Way MD      "

## 2021-09-21 NOTE — PROGRESS NOTES
"Service Date: 09/21/2021    REASON FOR VISIT:  Lydia William is a 61-year-old woman who I follow for MS.  She returns for routine followup.  I last saw her 1 year ago.    HISTORY OF PRESENT ILLNESS:  Lydia had a fall in May where she fractured and dislocated her left elbow requiring surgery.  It was a hot day and she attributes the fall to that, basically just tripped.  She does have bilateral foot drop, but did not tolerate ankle foot orthotics.  Both she and her  agree that her walking has been stable and her  notes ironically that just prior to the fall he had been complimenting her on how much better her walking appeared.  She does not have any numbness or tingling, no symptoms from the neck up and motor function in the arms is fine.  Both legs are a bit weak and clumsy.  She continues to take Rebif and tolerates it well.  She takes the injections around 8:00 p.m.  She does note that she had a pretty severe reaction to the second COVID-19 vaccine, and her  says she was \"pretty much paralyzed\" for about a day.  Lydia recalls staying in bed most of the day and then getting up for dinner and feeling her normal self.    PHYSICAL EXAMINATION:  Blood pressure 110/70, pulse 82, weight 109 pounds.  She is alert and oriented.  Affect is bright and language functions are normal.  Cranial nerves are unremarkable.  Muscle bulk and tone are normal.  Strength in the arms is normal, but hip flexion is weak at 4/5 bilaterally.  Knee flexion strength is normal as is ankle dorsiflexion.  Light touch is intact throughout.  Reflexes are pathologically brisk and symmetric.  Gait is wide-based and somewhat spastic without any hemiparetic component.  She is unable to walk on the heels.  I did not attempt to have her walk in tandem, but I am sure that would be impossible.  Romberg sign is mildly positive.    IMPRESSION:  Multiple sclerosis, most likely secondary progressive stage, stable on interferon beta.  I spent 42 " "minutes on Alan's care today, which included chart review, face-to-face and documentation time.  She had several questions regarding vaccines including COVID boosters and the shingles vaccine, and her  asked about any sort of backlash risk for her after the pandemic is over after everybody wearing masks and social distancing.  I told him about the \"hygiene hypothesis,\" but how there is really no data strongly supporting that and I do not think any \"immunological backlash\" is likely.  We mainly discussed the question of how long to continue Rebif.  I told her that I think the likelihood it is helping her is rather low, but not zero and ultimately they preferred to keep it going up through her mid 60s, which is reasonable.    PLAN:    1.  AST, ALT, CBC today.  2.  Follow up in 1 year.    Elijah Way MD        D: 2021   T: 2021   MT: liza    Name:     ALAN JOHNSON  MRN:      [id number]        Account:      [id number]   :      1960           Service Date: 2021       Document: [id number]    "

## 2021-09-22 ENCOUNTER — TELEPHONE (OUTPATIENT)
Dept: NEUROLOGY | Facility: CLINIC | Age: 61
End: 2021-09-22

## 2021-09-22 NOTE — TELEPHONE ENCOUNTER
PA Initiation    Medication: Dalfampridine- Pending  Insurance Company: [employer] ([facility]) - Phone [phone] Fax [phone]  Pharmacy Filling the Rx[facility] - [place], [place] - [address]  Filling Pharmacy Phone:    Filling Pharmacy Fax:    Start Date: 9/22/2021

## 2021-09-25 ENCOUNTER — HEALTH MAINTENANCE LETTER (OUTPATIENT)
Age: 61
End: 2021-09-25

## 2021-09-27 NOTE — TELEPHONE ENCOUNTER
Prior Authorization Approval    Authorization Effective Date: 9/22/2022  Authorization Expiration Date: 9/22/2023  Medication: Dalfampridine- Approved  Approved Dose/Quantity:10mg  Reference #: CMM Key [id number]   Insurance Company: Christine ([facility]) - Phone [phone] Fax [phone]  Expected CoPay:       CoPay Card Available:      Foundation Assistance Needed:    Which Pharmacy is filling the prescription (Not needed for infusion/clinic administered): [facility] - [place], [place] [facility] [address]   Pharmacy Notified:    Patient Notified:

## 2021-11-14 ENCOUNTER — HEALTH MAINTENANCE LETTER (OUTPATIENT)
Age: 61
End: 2021-11-14

## 2021-12-15 NOTE — TELEPHONE ENCOUNTER
Pt has received letter from insurance company [employer] stating that beginning 1/1/2022 they will no longer cover Rebif. Pt has been stable on Rebif and prefers to continue. Per previous communication with specialty pharmacy liaison team, insurance company will not review PA request until late December at the earliest. Routing to specialty pharmacy liaison team to prepare PA request at the appropriate time.      Kylie Munoz RN

## 2021-12-15 NOTE — TELEPHONE ENCOUNTER
Received message from specialty pharmacy liaison team that they have been in contact with pt's insurance and received confirmation that current PA is good through July, and will just need to be renewed at that time. Anvato message sent to pt with this information.    Kylie Munoz RN

## 2021-12-16 DIAGNOSIS — G35 MULTIPLE SCLEROSIS (H): ICD-10-CM

## 2021-12-16 NOTE — TELEPHONE ENCOUNTER
Received refill request for REBIF from [facility]; Patient was last seen on 9/21/2021 and has follow up appointment on 9/27/2022 with davy. Pended to MS pool for review/approval      Brandy August MA

## 2021-12-17 RX ORDER — INTERFERON BETA-1A 44 UG/.5ML
INJECTION, SOLUTION SUBCUTANEOUS
Qty: 6 ML | Refills: 11 | Status: SHIPPED | OUTPATIENT
Start: 2021-12-17 | End: 2022-09-27

## 2021-12-17 NOTE — TELEPHONE ENCOUNTER
[facility] Call Center    Phone Message    May a detailed message be left on voicemail: yes     Reason for Call:   Per Anatoliy from [facility] starting 1/1/22 REBIF will no longer be cover. [phone]      Action Taken: Message routed to:  Other: csc    Travel Screening: Not Applicable

## 2021-12-17 NOTE — TELEPHONE ENCOUNTER
Called Anatoliy with [facility] and informed him our PA team has been in touch with pt's insurance and they have confirmed PA good through July and can be renewed after that. Anatoliy agreed that if PA team has been in touch with insurance, this should not be a problem.    Kylie Munoz RN

## 2021-12-21 NOTE — TELEPHONE ENCOUNTER
Patients insurance is not going to be part of the formulary for Rebif as of 1/1/22    There are alternatives that patient can take - Avonex, Plegridy, or beta-seron.  These are in the same class. Wanting to make sure patient provider knew this and that patient wont be without therapy in the new year.

## 2021-12-21 NOTE — TELEPHONE ENCOUNTER
Returned phone call from \[facility]\"" Specialty pharmacy. Informed them that our prior authorization team has been in contact with pt's insurance and that we have received confirmation that the current prior auth, which is good through July, will continue to be valid in the new year. \[facility]\""  confirmed with their PA team that pt will indeed be able to fill the medication.     Kylie Munoz RN

## 2022-01-09 ENCOUNTER — HEALTH MAINTENANCE LETTER (OUTPATIENT)
Age: 62
End: 2022-01-09

## 2022-01-25 ENCOUNTER — TELEPHONE (OUTPATIENT)
Dept: NEUROLOGY | Facility: CLINIC | Age: 62
End: 2022-01-25
Payer: COMMERCIAL

## 2022-01-25 NOTE — TELEPHONE ENCOUNTER
**The preferred products on the plan are Glatiramer, Glatopa, Dimethyl Fumarate, Aubagio, Avonex, Plegridy or Gilenya**      PRIOR AUTHORIZATION DENIED    Medication: Rebif--Denied    Denial Date: 1/18/2022    Denial Rational:       Appeal Information:

## 2022-01-25 NOTE — TELEPHONE ENCOUNTER
PA Initiation    Medication: Rebif--Initiated  Insurance Company: Christine ([facility]) - Phone [phone] Fax [phone]  Pharmacy Filling the Rx:    Filling Pharmacy Phone:    Filling Pharmacy Fax:    Start Date: 1/25/2022    Faxed to [phone]

## 2022-01-25 NOTE — LETTER
2022    [facility] Department    RE: Lydia William   : 1960   Member ID:  [id number]      To Whom It May Concern:    I am writing on behalf of my patient, Lydia William, to document the medical necessity of Rebif for the treatment of Multiple Sclerosis. This letter provides information about the patient's medication history and diagnosis and a statement summarizing my treatment rationale.     You have previously denied Rebif, stating that Ms. William has not previously tried and failed Glatiramer, Glatopa, Dimethyl Fumarate, Aubagio, Avonex, Plegridy, or Gilenya. I will remind you that Rebif is an FDA-approved treatment for Multiple Sclerosis. There is no medical or safety basis as to why you are denying coverage of Rebif. Ms. William is currently clinically and radiologically stable on Rebif, and has been on Rebif since . Therefore, to have her change disease modifying therapy at this time is not in the best interest of her health and may be unsafe. In order to provide safe and effective care for Ms. William, and to provide her with the best chances of maintaining her ability to be a functioning member of society, I urge you to cover Rebif for Lydia. Please contact my office with questions.         Sincerely,      MD Kylie Byers, MS RN Care Coordinator  [facility]  [address] [place] [address]    Phone: [phone]  Fax: [phone]

## 2022-01-31 NOTE — TELEPHONE ENCOUNTER
MEDICATION APPEAL APPROVED    Medication: Rebif-APPROVED  Authorization Effective Date: 1/28/2022  Authorization Expiration Date: 1/28/2023  Approved Dose/Quantity: UD  Reference #: Faxed to [phone]   Insurance Company:    Expected CoPay:       CoPay Card Available:      Foundation Assistance Needed:    Which Pharmacy is filling the prescription (Not needed for infusion/clinic administered):      Called and appeal was approved Case#[id number]  Appeal# [id number]

## 2022-03-29 DIAGNOSIS — G35 MULTIPLE SCLEROSIS (H): ICD-10-CM

## 2022-03-29 NOTE — TELEPHONE ENCOUNTER
Received refill request for BACLOFEN from [facility]; Patient was last seen on 9/21/2021 and has follow up appointment on 9/27/2022 with davy Pended to MS pool for review/approval    Brandy August MA

## 2022-03-30 RX ORDER — BACLOFEN 10 MG/1
10 TABLET ORAL 3 TIMES DAILY
Qty: 270 TABLET | Refills: 3 | OUTPATIENT
Start: 2022-03-30

## 2022-03-30 NOTE — TELEPHONE ENCOUNTER
Spoke with [facility]. They confirmed they have a current rx. One year rx was sent in Sep 2021.    Kylie Munoz RN

## 2022-09-14 DIAGNOSIS — G35 MULTIPLE SCLEROSIS (H): ICD-10-CM

## 2022-09-15 RX ORDER — DALFAMPRIDINE 10 MG/1
TABLET, FILM COATED, EXTENDED RELEASE ORAL
Qty: 60 TABLET | Refills: 11 | Status: SHIPPED | OUTPATIENT
Start: 2022-09-15 | End: 2022-09-27

## 2022-09-15 NOTE — TELEPHONE ENCOUNTER
Received refill request for dalfampridine from [facility]; Patient was last seen in September 2021 and has follow up appointment in September 2022 with Dr Way. Refilled per MS refill protocol.    Kylie Munoz RN

## 2022-09-27 ENCOUNTER — OFFICE VISIT (OUTPATIENT)
Dept: NEUROLOGY | Facility: CLINIC | Age: 62
End: 2022-09-27
Attending: PSYCHIATRY & NEUROLOGY
Payer: COMMERCIAL

## 2022-09-27 ENCOUNTER — LAB (OUTPATIENT)
Dept: LAB | Facility: CLINIC | Age: 62
End: 2022-09-27
Payer: COMMERCIAL

## 2022-09-27 VITALS
HEART RATE: 80 BPM | OXYGEN SATURATION: 97 % | WEIGHT: 111.9 LBS | BODY MASS INDEX: 19.82 KG/M2 | DIASTOLIC BLOOD PRESSURE: 75 MMHG | SYSTOLIC BLOOD PRESSURE: 119 MMHG

## 2022-09-27 DIAGNOSIS — G35 MULTIPLE SCLEROSIS (H): Primary | ICD-10-CM

## 2022-09-27 DIAGNOSIS — M62.838 MUSCLE SPASTICITY: ICD-10-CM

## 2022-09-27 DIAGNOSIS — G35 MULTIPLE SCLEROSIS (H): ICD-10-CM

## 2022-09-27 LAB
ALT SERPL W P-5'-P-CCNC: 12 U/L (ref 10–35)
AST SERPL W P-5'-P-CCNC: 24 U/L (ref 10–35)
ERYTHROCYTE [DISTWIDTH] IN BLOOD BY AUTOMATED COUNT: 12.6 % (ref 10–15)
HCT VFR BLD AUTO: 37.6 % (ref 35–47)
HGB BLD-MCNC: 12.7 G/DL (ref 11.7–15.7)
MCH RBC QN AUTO: 30.5 PG (ref 26.5–33)
MCHC RBC AUTO-ENTMCNC: 33.8 G/DL (ref 31.5–36.5)
MCV RBC AUTO: 90 FL (ref 78–100)
PLATELET # BLD AUTO: 173 10E3/UL (ref 150–450)
RBC # BLD AUTO: 4.17 10E6/UL (ref 3.8–5.2)
WBC # BLD AUTO: 5.1 10E3/UL (ref 4–11)

## 2022-09-27 PROCEDURE — 36415 COLL VENOUS BLD VENIPUNCTURE: CPT | Performed by: PATHOLOGY

## 2022-09-27 PROCEDURE — 84460 ALANINE AMINO (ALT) (SGPT): CPT | Performed by: PATHOLOGY

## 2022-09-27 PROCEDURE — 99213 OFFICE O/P EST LOW 20 MIN: CPT | Mod: GC | Performed by: PSYCHIATRY & NEUROLOGY

## 2022-09-27 PROCEDURE — 84450 TRANSFERASE (AST) (SGOT): CPT | Performed by: PATHOLOGY

## 2022-09-27 PROCEDURE — 85027 COMPLETE CBC AUTOMATED: CPT | Performed by: PATHOLOGY

## 2022-09-27 RX ORDER — DALFAMPRIDINE 10 MG/1
TABLET, FILM COATED, EXTENDED RELEASE ORAL
Qty: 60 TABLET | Refills: 11 | Status: SHIPPED | OUTPATIENT
Start: 2022-09-27 | End: 2023-08-10

## 2022-09-27 RX ORDER — BACLOFEN 10 MG/1
10 TABLET ORAL 3 TIMES DAILY
Qty: 270 TABLET | Refills: 3 | Status: SHIPPED | OUTPATIENT
Start: 2022-09-27 | End: 2023-09-26

## 2022-09-27 RX ORDER — INTERFERON BETA-1A 44 UG/.5ML
INJECTION, SOLUTION SUBCUTANEOUS
Qty: 6 ML | Refills: 11 | Status: SHIPPED | OUTPATIENT
Start: 2022-09-27 | End: 2023-09-18

## 2022-09-27 ASSESSMENT — PAIN SCALES - GENERAL: PAINLEVEL: NO PAIN (0)

## 2022-09-27 NOTE — PROGRESS NOTES
Neurology Clinic Visit      2022   Source of information: Patient and chart review    History of Present Symptom:  Lydia William is a 62 year old female with a PMH significant for multiple sclerosis who presents today for follow-up.    She reports significant leg weakness and stiffness following her shingles and flue vaccine on 9/15. About 8 hours later she was unable to walk and required her  to help her get around. This did not last more than a few hours but it was fairly distressing to her and her .     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 She had discussed going off of rebif and had decided to continue rebif until her mid 60s. She is still favorable to this plan.     Disease onset:  double vision  Has since developed bilateral foot drop and stiffness and clumsiness in the legs occasional tingling in the arms and urinary urgency.   Previous disease modifying therapy:   Rebif  - present     Symptom management:  Gait/balance: Able to walk 300 feet before stopping to rest because her legs would be tired out. She had one fall where her toe got caught on a ridge.   -taking dalfampridine, this helps her walking.   -baclofen 10 mg TID, this is quite helpful. Reports occasional twinge of muscle spasm. Mainly experiences stiffness in her legs.     The patient's medical, surgical, social, and family history were personally reviewed with the patient.  Past Medical History:   Diagnosis Date     Multiple sclerosis (H)       Past Surgical History:   Procedure Laterality Date     CHOLECYSTECTOMY       Social History     Tobacco Use     Smoking status: Former Smoker     Packs/day: 1.00     Types: Cigarettes     Quit date: 1991     Years since quittin.1     Smokeless tobacco: Never Used   Substance Use Topics     Alcohol use: Yes     Alcohol/week: 3.0 standard drinks     Types: 3 Glasses of wine per week     Comment: MONTHLY      Drug use: No     No family history on file.  Current Outpatient Medications   Medication     baclofen (LIORESAL) 10 MG tablet     Cholecalciferol (VITAMIN D3) 400 UNITS CAPS     Dalfampridine (AMPYRA) 10 MG TB12     ibuprofen (ADVIL,MOTRIN) 200 MG tablet     interferon beta-1a (REBIF) 44 MCG/0.5ML injection     Multiple Vitamins-Minerals (MULTIVITAMIN WOMEN) TABS     No current facility-administered medications for this visit.     No Known Allergies      Review of Systems:  14-point review of systems was completed. The pertinent positives and negatives are in the HPI.    Physical Examination   Vitals: There were no vitals taken for this visit.  General: Patient appears comfortable in no acute distress.   HEENT: NC/AT, no icterus, moist mucous membranes  Chest: non-labored on RA  Extremities: Warm, no edema  Skin: No rash or lesion   Psych: Affect appropriate for situation   Neuro:  Mental status: Awake, alert, attentive. Language is fluent with intact comprehension of commands.  Cranial nerves: PERRL with no relative afferent pupillary defect, conjugate gaze, EOMI, visual fields intact, face symmetric, shoulder shrug strong, tongue protrusion/uvula midline, no dysarthria.   Motor: some mild/moderate spasticity in the legs/thighs. No abnormal movements.      R L  Deltoid  5 5  Biceps  5 5  Triceps 5 5  Wrist ext 5 5  Finger ext 5 5  Finger abd 5 5    Hip flexion 4+ 4+  Knee flexion 5 5  Knee ext 5 5  Dorsiflexion 5 5-    Reflexes: brisk reflexes symmetric in biceps, brachioradialis, 3+ patellae, and achilles. No clonus.  Sensory: Intact to light touch. Romberg is negative.   Coordination: FNF without ataxia or dysmetria.    Gait: Spastic very wide based gait. Mild gait apraxia. Unable to do tandem gait at all.     Laboratory:  All laboratory data reviewed    Imaging:    MRI brain   Impression:   1. The study demonstrates greater than 20 foci of T2-hyperintensity  within the cerebral white matter consistent with the  clinical  suspicion of demyelinating disease. There are no foci of abnormal  enhancement noted intracranially .   2. There is no significant interval change from the prior study.    Assessment/Plan:  Lydia William is a 62 year old female who presents for follow up for multiple sclerosis with concern for secondary progressive course although her walking has been stable over the past year.     We discussed that the reaction to the vaccinations is likely related to spasticity/stiffness which can be brought out by any rise in body temperature. This is not surpising considering her examination with spasticity in the legs gait and very brisk reflexes in the legs. We discussed that this is something that is common with patients with MS.     We will continue Rebif until her mid 60s as previously discussed. She is tolerating this well and prefers to stay on the drug.     -okay to continue with recommended vaccinations.  -recommend to take tylenol an hour prior to the vaccination and a few hours after, plan to take it easy that day and have someone on hand to help you out afterwards.  -continue rebif   -continue baclofen and dalfampridine   -blood work today (AST, ALT, CBC)  -follow up one year     Patient seen and discussed with Dr. Way.  I have reviewed the plan with the patient, who is in agreement.      Mansi Allen, DO  Multiple Sclerosis Fellow      I saw and examined this patient, and have read and edited the resident's note.  I agree with the findings, assessment, and plan.  I spent 27 minutes on her care on the date of service including chart review and face to face time.  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  Plan as above.    Elijah Way MD

## 2022-09-27 NOTE — LETTER
9/27/2022       RE: Lydia William  [address] [place] [address]     Dear Colleague,    Thank you for referring your patient, Lydia William, to the [facility] [place] at [facility]. Please see a copy of my visit note below.      Neurology Clinic Visit      09/27/2022   Source of information: Patient and chart review    History of Present Symptom:  Lydia William is a 62 year old female with a PMH significant for multiple sclerosis who presents today for follow-up.    She reports significant leg weakness and stiffness following her shingles and flue vaccine on 9/15. About 8 hours later she was unable to walk and required her  to help her get around. This did not last more than a few hours but it was fairly distressing to her and her .     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 She had discussed going off of rebif and had decided to continue rebif until her mid 60s. She is still favorable to this plan.     Disease onset: 1987 double vision  Has since developed bilateral foot drop and stiffness and clumsiness in the legs occasional tingling in the arms and urinary urgency.   Previous disease modifying therapy:   Rebif 2006 - present     Symptom management:  Gait/balance: Able to walk 300 feet before stopping to rest because her legs would be tired out. She had one fall where her toe got caught on a ridge.   -taking dalfampridine, this helps her walking.   -baclofen 10 mg TID, this is quite helpful. Reports occasional twinge of muscle spasm. Mainly experiences stiffness in her legs.     The patient's medical, surgical, social, and family history were personally reviewed with the patient.  Past Medical History:   Diagnosis Date     Multiple sclerosis (H)       Past Surgical History:   Procedure Laterality Date     CHOLECYSTECTOMY       Social History      Tobacco Use     Smoking status: Former Smoker     Packs/day: 1.00     Types: Cigarettes     Quit date: 1991     Years since quittin.1     Smokeless tobacco: Never Used   Substance Use Topics     Alcohol use: Yes     Alcohol/week: 3.0 standard drinks     Types: 3 Glasses of wine per week     Comment: MONTHLY     Drug use: No     No family history on file.  Current Outpatient Medications   Medication     baclofen (LIORESAL) 10 MG tablet     Cholecalciferol (VITAMIN D3) 400 UNITS CAPS     Dalfampridine (AMPYRA) 10 MG TB12     ibuprofen (ADVIL,MOTRIN) 200 MG tablet     interferon beta-1a (REBIF) 44 MCG/0.5ML injection     Multiple Vitamins-Minerals (MULTIVITAMIN WOMEN) TABS     No current facility-administered medications for this visit.     No Known Allergies      Review of Systems:  14-point review of systems was completed. The pertinent positives and negatives are in the HPI.    Physical Examination   Vitals: There were no vitals taken for this visit.  General: Patient appears comfortable in no acute distress.   HEENT: NC/AT, no icterus, moist mucous membranes  Chest: non-labored on RA  Extremities: Warm, no edema  Skin: No rash or lesion   Psych: Affect appropriate for situation   Neuro:  Mental status: Awake, alert, attentive. Language is fluent with intact comprehension of commands.  Cranial nerves: PERRL with no relative afferent pupillary defect, conjugate gaze, EOMI, visual fields intact, face symmetric, shoulder shrug strong, tongue protrusion/uvula midline, no dysarthria.   Motor: some mild/moderate spasticity in the legs/thighs. No abnormal movements.      R L  Deltoid  5 5  Biceps  5 5  Triceps 5 5  Wrist ext 5 5  Finger ext 5 5  Finger abd 5 5    Hip flexion 4+ 4+  Knee flexion 5 5  Knee ext 5 5  Dorsiflexion 5 5-    Reflexes: brisk reflexes symmetric in biceps, brachioradialis, 3+ patellae, and achilles. No clonus.  Sensory: Intact to light touch. Romberg is negative.   Coordination: FNF  without ataxia or dysmetria.    Gait: Spastic very wide based gait. Mild gait apraxia. Unable to do tandem gait at all.     Laboratory:  All laboratory data reviewed    Imaging:    MRI brain   Impression:   1. The study demonstrates greater than 20 foci of T2-hyperintensity  within the cerebral white matter consistent with the clinical  suspicion of demyelinating disease. There are no foci of abnormal  enhancement noted intracranially .   2. There is no significant interval change from the prior study.    Assessment/Plan:  Lydia William is a 62 year old female who presents for follow up for multiple sclerosis with concern for secondary progressive course although her walking has been stable over the past year.     We discussed that the reaction to the vaccinations is likely related to spasticity/stiffness which can be brought out by any rise in body temperature. This is not surpising considering her examination with spasticity in the legs gait and very brisk reflexes in the legs. We discussed that this is something that is common with patients with MS.     We will continue Rebif until her mid 60s as previously discussed. She is tolerating this well and prefers to stay on the drug.     -okay to continue with recommended vaccinations.  -recommend to take tylenol an hour prior to the vaccination and a few hours after, plan to take it easy that day and have someone on hand to help you out afterwards.  -continue rebif   -continue baclofen and dalfampridine   -blood work today (AST, ALT, CBC)  -follow up one year     Patient seen and discussed with Dr. Way.  I have reviewed the plan with the patient, who is in agreement.      Mansi Allen, DO  Multiple Sclerosis Fellow      I saw and examined this patient, and have read and edited the resident's note.  I agree with the findings, assessment, and plan.  I spent 27 minutes on her care on the date of service including chart review and face to face time.  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  Plan as above.      Elijah Way MD

## 2022-10-12 ENCOUNTER — TELEPHONE (OUTPATIENT)
Dept: NEUROLOGY | Facility: CLINIC | Age: 62
End: 2022-10-12

## 2022-10-12 NOTE — TELEPHONE ENCOUNTER
PA Initiation    Medication: Dalfampridine ER 10MG Capsules (PA PENDING)  Insurance Company: [employer] ([facility]) - Phone [phone] Fax [phone]  Pharmacy Filling the Rx: [facility] - [place], [place] - [address]  Filling Pharmacy Phone:    Filling Pharmacy Fax:    Start Date: 10/12/2022        Thank you,    Dominique Lemon [facility]  Specialty Pharmacy Clinic Liaison - CardiologyNeurologyMultiple [address]  3rd [place] [address]  Ph: [phone] Fax: [phone]  [email or web address]

## 2022-10-13 NOTE — TELEPHONE ENCOUNTER
Prior Authorization Approval    Authorization Effective Date: 10/12/2022  Authorization Expiration Date: 10/12/2023  Medication: Dalfampridine ER 10MG Capsules (PA APPROVED)  Approved Dose/Quantity: 30 days  Reference #:     Insurance Company: [employer] ([facility]) - Phone [phone] Fax [phone]  Expected CoPay:       CoPay Card Available: No    Foundation Assistance Needed:    Which Pharmacy is filling the prescription (Not needed for infusion/clinic administered): [facility] - [address]  Pharmacy Notified:    Patient Notified:            Thank you,    Dominique Lemon [facility]  Specialty Pharmacy Clinic Liaison - CardiologyNeurologyMultiple Sclerosis  [facility]  [address] [place] [address]  Ph: [phone] Fax: [phone]  [email or web address]

## 2023-01-05 ENCOUNTER — TELEPHONE (OUTPATIENT)
Dept: NEUROLOGY | Facility: CLINIC | Age: 63
End: 2023-01-05
Payer: COMMERCIAL

## 2023-01-05 NOTE — TELEPHONE ENCOUNTER
PA Initiation    Medication: Rebif 44MCG/0.5mL Syringes (PA PENDING)  Insurance Company: [employer] ([facility]) - Phone [phone] Fax [phone]  Pharmacy Filling the Rx: [facility] - [place], [place] - [address]  Filling Pharmacy Phone:    Filling Pharmacy Fax:    Start Date: 1/5/2023        Thank you,    Dominique Lemon [facility]  Specialty Pharmacy Clinic Liaison - CardiologyNeurologyMultiple [facility] [address] [place] [address]  Ph: [phone] Fax: [phone]  [email or web address]

## 2023-01-05 NOTE — LETTER
2023     [facility] Department     RE:      Lydia William              : 1960              [facility] Member ID:  [id number]        To Whom It May Concern:     I am writing on behalf of my patient, Lydia William, to document the medical necessity of Rebif for the treatment of Multiple Sclerosis. This letter provides information about the patient's medication history and diagnosis and a statement summarizing my treatment rationale.      You have denied Rebif, stating that Rebif is excluded from coverage under Ms. William's insurance plan. I will remind you that Rebif is an FDA-approved treatment for Multiple Sclerosis. There is no medical or safety basis as to why you are denying coverage of Rebif. Ms. William is currently clinically and radiologically stable on Rebif, and has been on Rebif since . Therefore, to have her change disease modifying therapy at this time is not in the best interest of her health and may be unsafe. In order to provide safe and effective care for Ms. William, and to provide her with the best chances of maintaining her ability to be a functioning member of society, I urge you to cover Rebif for Lydia. Please contact my office with questions.            Sincerely,        MD Kylie Byers, MS RN Care Coordinator  [facility]  [address] [place] [address]     Phone: [phone]  Fax: [phone]

## 2023-01-13 NOTE — TELEPHONE ENCOUNTER
PRIOR AUTHORIZATION DENIED    Medication: Rebif 44MCG/0.5mL Syringes (PA DENIED)    Denial Date: 1/5/2023    Denial Rational: Criteria not met    Appeal Information:

## 2023-01-23 NOTE — TELEPHONE ENCOUNTER
Medication Appeal Initiation    We have initiated an appeal for the requested medication:  Medication: Rebif 44MCG/0.5mL Syringes (APPEAL PENDING)  Appeal Start Date:  1/23/2023  Insurance Company: [employer] ([facility]) - Phone [phone] Fax [phone]  Comments:  Appeal letter submitted with OV from 9/2022 and original denial letter.     Thank you,    Dominique Lemon [facility]  Specialty Pharmacy Clinic Liaison - CardiologyNeurologyMultiple [address] [address]  Ph: [phone] Fax: [phone]  [email or web address]

## 2023-01-31 NOTE — TELEPHONE ENCOUNTER
MEDICATION APPEAL APPROVED    Medication: Rebif 44MCG/0.5mL Syringes (APPEAL APPROVED)  Authorization Effective Date: 1/28/2023  Authorization Expiration Date: 1/28/2024  Approved Dose/Quantity: 28 days  Reference #:     Insurance Company: [employer] ([facility]) - Phone [phone] Fax [phone]  Expected CoPay:       CoPay Card Available: Yes    Foundation Assistance Needed:    Which Pharmacy is filling the prescription (Not needed for infusion/clinic administered): [facility] - [address]      Appeal approved for REBIF. [facility] will be refaxing the determination to [phone].     Thank you,    Dominique Lemon [facility]  Specialty Pharmacy Clinic Liaison - CardiologyNeurologyMultiple Sclerosis  [facility]  [address] [place] [address]  3rd [place] [address]  Ph: [phone] Fax: [phone]  [email or web address]

## 2023-01-31 NOTE — TELEPHONE ENCOUNTER
Appeal approved:        Thank you,    Dominique Lemon [facility]  Specialty Pharmacy Clinic Liaison - CardiologyNeurologyMultiple Sclerosis  [facility] [place] [address]  Ph: [phone] Fax: [phone]  [email or web address]

## 2023-04-16 ENCOUNTER — HEALTH MAINTENANCE LETTER (OUTPATIENT)
Age: 63
End: 2023-04-16

## 2023-08-09 DIAGNOSIS — G35 MULTIPLE SCLEROSIS (H): ICD-10-CM

## 2023-08-10 RX ORDER — DALFAMPRIDINE 10 MG/1
TABLET, FILM COATED, EXTENDED RELEASE ORAL
Qty: 60 TABLET | Refills: 11 | Status: SHIPPED | OUTPATIENT
Start: 2023-08-10 | End: 2023-09-26

## 2023-08-10 NOTE — TELEPHONE ENCOUNTER
Received refill request for dalfampridine from [facility]; Patient was last seen in September 2023 and has follow up appointment in September 2024 with Dr Way. Refilled per MS refill protocol.    Kylie Munoz RN

## 2023-09-13 ENCOUNTER — TELEPHONE (OUTPATIENT)
Dept: NEUROLOGY | Facility: CLINIC | Age: 63
End: 2023-09-13
Payer: MEDICARE

## 2023-09-13 NOTE — TELEPHONE ENCOUNTER
PA Initiation    Medication: DALFAMPRIDINE ER 10 MG PO TB12  Insurance Company: [employer] ([facility]) - Phone [phone] Fax [phone]  Pharmacy Filling the Rx: [facility] - [place], [place] - [address]  Filling Pharmacy Phone:    Filling Pharmacy Fax:    Start Date: 9/13/2023        Thank you,    Dominique Lemon [facility]  Specialty Pharmacy Clinic Liaison - [facility]Ne[facility]Mult[facility] [address]  3rd [place] [address]  Ph: [phone] Fax: [phone]  [email or web address]

## 2023-09-16 DIAGNOSIS — G35 MULTIPLE SCLEROSIS (H): ICD-10-CM

## 2023-09-18 RX ORDER — INTERFERON BETA-1A 44 UG/.5ML
INJECTION, SOLUTION SUBCUTANEOUS
Qty: 6 ML | Refills: 11 | Status: SHIPPED | OUTPATIENT
Start: 2023-09-18 | End: 2024-09-06

## 2023-09-18 NOTE — TELEPHONE ENCOUNTER
Received refill request for Rebif from [facility]; Patient was last seen in September 2022 and has follow up appointment this September with Dr. Way. Refilled per MS refill protocol.    Mansi Salguero RN

## 2023-09-19 NOTE — TELEPHONE ENCOUNTER
Prior Authorization Approval    Medication: DALFAMPRIDINE ER 10 MG PO TB12  Authorization Effective Date: 9/13/2023  Authorization Expiration Date: 9/13/2024  Approved Dose/Quantity: 30 days  Reference #:     Insurance Company: [employer] ([facility]) - Phone [phone] Fax [phone]  Expected CoPay:       CoPay Card Available: No    Financial Assistance Needed: N/A  Which Pharmacy is filling the prescription: [facility] - [address]  Pharmacy Notified:    Patient Notified:            Thank you,    Dominique Lemon [facility]  Specialty Pharmacy Clinic Liaison - CardiologyNeurologyMultiple [facility] [address] [address]  Ph: [phone] Fax: [phone]  [email or web address]

## 2023-09-20 DIAGNOSIS — G35 MULTIPLE SCLEROSIS (H): ICD-10-CM

## 2023-09-20 RX ORDER — INTERFERON BETA-1A 44 UG/.5ML
INJECTION, SOLUTION SUBCUTANEOUS
Qty: 6 ML | Refills: 11 | OUTPATIENT
Start: 2023-09-20

## 2023-09-20 NOTE — TELEPHONE ENCOUNTER
Rx has been released to [facility]. Refill request declined as duplicate.    Kylie Munoz RN

## 2023-09-20 NOTE — TELEPHONE ENCOUNTER
Duplicate request for Rebif from [facility]. Rx currently with prior auth department.    Kylie Munoz RN

## 2023-09-26 ENCOUNTER — OFFICE VISIT (OUTPATIENT)
Dept: NEUROLOGY | Facility: CLINIC | Age: 63
End: 2023-09-26
Attending: PSYCHIATRY & NEUROLOGY
Payer: MEDICARE

## 2023-09-26 VITALS
OXYGEN SATURATION: 96 % | SYSTOLIC BLOOD PRESSURE: 113 MMHG | WEIGHT: 109.9 LBS | BODY MASS INDEX: 19.47 KG/M2 | DIASTOLIC BLOOD PRESSURE: 74 MMHG | HEART RATE: 68 BPM

## 2023-09-26 DIAGNOSIS — Z91.81 RISK FOR FALLS: Primary | ICD-10-CM

## 2023-09-26 DIAGNOSIS — G35 MULTIPLE SCLEROSIS (H): ICD-10-CM

## 2023-09-26 PROCEDURE — G0463 HOSPITAL OUTPT CLINIC VISIT: HCPCS | Performed by: PSYCHIATRY & NEUROLOGY

## 2023-09-26 PROCEDURE — 99213 OFFICE O/P EST LOW 20 MIN: CPT | Performed by: PSYCHIATRY & NEUROLOGY

## 2023-09-26 RX ORDER — BACLOFEN 10 MG/1
10 TABLET ORAL 3 TIMES DAILY
Qty: 270 TABLET | Refills: 3 | Status: SHIPPED | OUTPATIENT
Start: 2023-09-26 | End: 2024-09-24

## 2023-09-26 RX ORDER — DALFAMPRIDINE 10 MG/1
TABLET, FILM COATED, EXTENDED RELEASE ORAL
Qty: 60 TABLET | Refills: 11 | Status: SHIPPED | OUTPATIENT
Start: 2023-09-26 | End: 2023-10-18

## 2023-09-26 ASSESSMENT — PAIN SCALES - GENERAL: PAINLEVEL: NO PAIN (0)
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
## 2023-09-26 NOTE — NURSING NOTE
Chief Complaint   Patient presents with    MS    RECHECK     MS follow up      Vitals were taken and medications were reconciled.    Johnson Becker, EMT  11:15 AM
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
## 2023-11-26 ENCOUNTER — HEALTH MAINTENANCE LETTER (OUTPATIENT)
Age: 63
End: 2023-11-26

## 2024-01-03 ENCOUNTER — TELEPHONE (OUTPATIENT)
Dept: NEUROLOGY | Facility: CLINIC | Age: 64
End: 2024-01-03

## 2024-01-03 ENCOUNTER — TELEPHONE (OUTPATIENT)
Dept: NEUROLOGY | Facility: CLINIC | Age: 64
End: 2024-01-03
Payer: MEDICARE

## 2024-01-03 NOTE — TELEPHONE ENCOUNTER
Prior Authorization Not Needed per Insurance    Medication: DALFAMPRIDINE ER 10 MG PO TB12  Insurance Company: [email or web address] Part D - Phone [phone] Fax [phone]  Expected CoPay: $    Pharmacy [facility]illing the Rx: [facility] [place] [place] [facility] [address] [facility]  Pharmacy Notified: No  Patient Notified: No          Thank you,    Dominique Lemon [facility]  Specialty Pharmacy Clinic Liaison - CardiologyNeurologyMult[facility] Surgery [address]  [address]rd [place] [address]  Ph: [phone] Fax: [phone]  [email or web address]

## 2024-01-03 NOTE — TELEPHONE ENCOUNTER
PA Initiation    Medication: REBIF REBIDOSE 44 MCG/0.5ML SC SOAJ  Insurance Company: [employer] Part D - Phone [phone] Fax [phone]  Pharmacy Filling the Rx: [facility] - [place], [place] - [address]  Filling Pharmacy Phone:    Filling Pharmacy Fax:    Start Date: 1/3/2024        Thank you,    Dominique Lemon [facility]  Specialty Pharmacy Clinic Liaison - CardiologyNeurologyMultiple [facility] Surgery [address]  [address]rd [place] [address]  Ph: [phone] Fax: [phone]  [email or web address]

## 2024-01-11 NOTE — TELEPHONE ENCOUNTER
PRIOR AUTHORIZATION DENIED    Medication: REBIF REBIDOSE 44 MCG/0.5ML SC SOAJ  Insurance Company: [email or web address] Part D - Phone [phone] Fax [phone]  Denial Date: 1/3/2024  Denial Reason(s):     Appeal Information:       Checking with clinic if okay to use previous appeal letter or if any changes need to be made before appealing.

## 2024-01-15 NOTE — TELEPHONE ENCOUNTER
Medication Appeal Initiation    Medication: REBIF REBIDOSE 44 MCG/0.5ML SC SOAJ  Appeal Start Date:  1/15/2024  Insurance Company: [facility] Part D  Insurance Phone: [phone]  Insurance Fax: [phone]  Comments:   Appeal letter faxed to [facility] Part D for review.       Thank you,    Dominique Lemon h-T  Specialty Pharmacy Clinic Liaison - CardiologyNeurologyMultiple [facility] Surgery [address] [place] [address]  Ph: [phone] Fax: [phone]  [email or web address]

## 2024-01-30 ENCOUNTER — DOCUMENTATION ONLY (OUTPATIENT)
Dept: NEUROLOGY | Facility: CLINIC | Age: 64
End: 2024-01-30
Payer: MEDICARE

## 2024-01-30 NOTE — PROGRESS NOTES
Authorization for Rebif Rebidose has been received from Medicare RX. Approval valid from 01/03/2024 through 12/31/2024.  Johnson Becker EMT 01/30/2024 12:19PM

## 2024-02-04 ENCOUNTER — HEALTH MAINTENANCE LETTER (OUTPATIENT)
Age: 64
End: 2024-02-04

## 2024-02-05 NOTE — TELEPHONE ENCOUNTER
MEDICATION APPEAL APPROVED    Medication: REBIF REBIDOSE 44 MCG/0.5ML SC SOAJ  Authorization Effective Date: 1/17/2024  Authorization Expiration Date: 12/31/2024  Approved Dose/Quantity: 28 days  Reference #: [id number]   Appeal Insurance Company: [employer] Part D - [facility]  Expected CoPay: $       CoPay Card Available: No  Financial Assistance Needed: N/A  Filling Pharmacy: [facility] - [place], [place] - [address]  Patient Notified: Yes  Comments:             Thank you,    Dominique Lemon [facility]  Specialty Pharmacy Clinic Liaison - CardiologyNeurologyMultiple Sclerosis  [facility]  [address] [place] [address]  3rd [place] [address]  Ph: [phone] Fax: [phone]  [email or web address]

## 2024-02-21 DIAGNOSIS — G35 MULTIPLE SCLEROSIS (H): ICD-10-CM

## 2024-02-21 RX ORDER — DALFAMPRIDINE 10 MG/1
TABLET, FILM COATED, EXTENDED RELEASE ORAL
Qty: 60 TABLET | Refills: 11 | OUTPATIENT
Start: 2024-02-21

## 2024-02-21 NOTE — TELEPHONE ENCOUNTER
Refill request for dalfampridine declined as there should be refills on file until October 2024.    Kylie Munoz RN

## 2024-02-23 ENCOUNTER — TELEPHONE (OUTPATIENT)
Dept: NEUROLOGY | Facility: CLINIC | Age: 64
End: 2024-02-23
Payer: MEDICARE

## 2024-02-23 DIAGNOSIS — G35 MULTIPLE SCLEROSIS (H): ICD-10-CM

## 2024-02-23 RX ORDER — DALFAMPRIDINE 10 MG/1
TABLET, FILM COATED, EXTENDED RELEASE ORAL
Qty: 60 TABLET | Refills: 11 | OUTPATIENT
Start: 2024-02-23

## 2024-02-23 NOTE — TELEPHONE ENCOUNTER
[facility] Call Center    Phone Message    May a detailed message be left on voicemail: yes     Reason for Call: Medication Question or concern regarding medication   Prescription Clarification  Name of Medication:   dalfampridine (AMPYRA) 10 MG TB12 12 hr tablet [[id number]] (Order [id number]   Prescribing Provider: Dr. Garza   Pharmacy: [facility]   What on the order needs clarification? Joey is calling from [facility] stating that Prior Auth is needed for Rx. Joey noticed that PA was cancelled but stating that there needs to be one done for Rx. Requesting for Verbal Auth- please call: [phone].    Please review advise.       Action Taken: Message routed to:  Other: Neurology    Travel Screening: Not Applicable

## 2024-02-28 NOTE — TELEPHONE ENCOUNTER
I'll submit the PA for brand, but the likelihood of it being approved is slim since she has not failed the generic Dalfampridine.      Thank you,    Dominique Lemon [facility]  Specialty Pharmacy Clinic Liaison - CardiologyNeurologyMultiple Sclerosis  [facility]  [address] [place] [address]  3rd [place] [address]  Ph: [phone] Fax: [phone]  [email or web address]

## 2024-02-28 NOTE — TELEPHONE ENCOUNTER
Retail Pharmacy Prior Authorization Team   Phone: [phone]        Patient's insurance called Team Line - Patient wants Brand not generic for the (AMPYRA) 10 MG TB12 12 hr tablet     PA was done and it was denied and per the rep that called, this was apparently submitted and denied?    The CMM Key [id number] was created by the pharmacy, but not completed and archived.      Patient's insurance number to call to complete: [phone]    I let the rep know I would send this to the Liaison/PFA whom handles this clinic for spec meds.

## 2024-02-28 NOTE — TELEPHONE ENCOUNTER
PA Initiation    Medication: AMPYRA 10 MG PO TB12  Insurance Company: [employer] Part D - Phone [phone] Fax [phone]  Pharmacy Filling the Rx[facility] - [place], [place] - [address] [address] [facility]  Filling Pharmacy Phone:    Filling Pharmacy Fax:    Start Date: 2/28/2024          Thank you,    Dominique Lemon [facility]  Specialty Pharmacy Clinic Liaison - CardiologyNeurologyMultiple [facility] Surgery [address]  [address]rd [place] [address]  Ph: [phone] Fax: [phone]  [email or web address]

## 2024-02-29 NOTE — TELEPHONE ENCOUNTER
PRIOR AUTHORIZATION DENIED    Medication: AMPYRA 10 MG PO TB12  Insurance Company: [employer] Part D - Phone [phone] Fax [phone]  Denial Date: 2/29/2024  Denial Reason(s): Must have specific medical reason as to why the patient is unable to take Dalfampridine  Appeal Information:     Patient Notified: Yes            Thank you,    Dominique Lemon [facility]  Specialty Pharmacy Clinic Liaison - CardiologyNeurologyMultiple Sclerosis  [facility] Surgery [address]  [address]rd [place] [address]  Ph: [phone] Fax: [phone]  [email or web address]

## 2024-06-05 ENCOUNTER — HOSPITAL ENCOUNTER (EMERGENCY)
Facility: CLINIC | Age: 64
Discharge: HOME | End: 2024-06-05
Attending: EMERGENCY MEDICINE | Admitting: EMERGENCY MEDICINE
Payer: MEDICARE

## 2024-06-05 ENCOUNTER — TRANSFERRED RECORDS (OUTPATIENT)
Dept: HEALTH INFORMATION MANAGEMENT | Facility: CLINIC | Age: 64
End: 2024-06-05

## 2024-06-05 ENCOUNTER — APPOINTMENT (OUTPATIENT)
Dept: GENERAL RADIOLOGY | Facility: CLINIC | Age: 64
End: 2024-06-05
Attending: EMERGENCY MEDICINE
Payer: MEDICARE

## 2024-06-05 VITALS
BODY MASS INDEX: 18.61 KG/M2 | OXYGEN SATURATION: 100 % | SYSTOLIC BLOOD PRESSURE: 123 MMHG | HEART RATE: 59 BPM | TEMPERATURE: 98.2 F | WEIGHT: 105 LBS | HEIGHT: 63 IN | DIASTOLIC BLOOD PRESSURE: 60 MMHG | RESPIRATION RATE: 18 BRPM

## 2024-06-05 DIAGNOSIS — R07.9 CHEST PAIN, UNSPECIFIED TYPE: ICD-10-CM

## 2024-06-05 DIAGNOSIS — R93.89 ABNORMAL CHEST X-RAY: ICD-10-CM

## 2024-06-05 LAB
ANION GAP SERPL CALCULATED.3IONS-SCNC: 13 MMOL/L (ref 7–15)
ATRIAL RATE - MUSE: 61 BPM
BASOPHILS # BLD AUTO: 0 10E3/UL (ref 0–0.2)
BASOPHILS NFR BLD AUTO: 0 %
BUN SERPL-MCNC: 18.8 MG/DL (ref 8–23)
CALCIUM SERPL-MCNC: 9.2 MG/DL (ref 8.8–10.2)
CHLORIDE SERPL-SCNC: 104 MMOL/L (ref 98–107)
CREAT SERPL-MCNC: 0.61 MG/DL (ref 0.51–0.95)
D DIMER PPP FEU-MCNC: <0.27 UG/ML FEU (ref 0–0.5)
DEPRECATED HCO3 PLAS-SCNC: 25 MMOL/L (ref 22–29)
DIASTOLIC BLOOD PRESSURE - MUSE: NORMAL MMHG
EGFRCR SERPLBLD CKD-EPI 2021: >90 ML/MIN/1.73M2
EOSINOPHIL # BLD AUTO: 0.1 10E3/UL (ref 0–0.7)
EOSINOPHIL NFR BLD AUTO: 2 %
ERYTHROCYTE [DISTWIDTH] IN BLOOD BY AUTOMATED COUNT: 12.8 % (ref 10–15)
GLUCOSE SERPL-MCNC: 81 MG/DL (ref 70–99)
HCT VFR BLD AUTO: 36.9 % (ref 35–47)
HGB BLD-MCNC: 12.1 G/DL (ref 11.7–15.7)
HOLD SPECIMEN: NORMAL
HOLD SPECIMEN: NORMAL
IMM GRANULOCYTES # BLD: 0 10E3/UL
IMM GRANULOCYTES NFR BLD: 0 %
INTERPRETATION ECG - MUSE: NORMAL
LYMPHOCYTES # BLD AUTO: 1.3 10E3/UL (ref 0.8–5.3)
LYMPHOCYTES NFR BLD AUTO: 31 %
MCH RBC QN AUTO: 30 PG (ref 26.5–33)
MCHC RBC AUTO-ENTMCNC: 32.8 G/DL (ref 31.5–36.5)
MCV RBC AUTO: 92 FL (ref 78–100)
MONOCYTES # BLD AUTO: 0.4 10E3/UL (ref 0–1.3)
MONOCYTES NFR BLD AUTO: 11 %
NEUTROPHILS # BLD AUTO: 2.2 10E3/UL (ref 1.6–8.3)
NEUTROPHILS NFR BLD AUTO: 56 %
NRBC # BLD AUTO: 0 10E3/UL
NRBC BLD AUTO-RTO: 0 /100
P AXIS - MUSE: 53 DEGREES
PLATELET # BLD AUTO: 165 10E3/UL (ref 150–450)
POTASSIUM SERPL-SCNC: 4.8 MMOL/L (ref 3.4–5.3)
PR INTERVAL - MUSE: 140 MS
QRS DURATION - MUSE: 76 MS
QT - MUSE: 416 MS
QTC - MUSE: 418 MS
R AXIS - MUSE: 33 DEGREES
RBC # BLD AUTO: 4.03 10E6/UL (ref 3.8–5.2)
SODIUM SERPL-SCNC: 142 MMOL/L (ref 135–145)
SYSTOLIC BLOOD PRESSURE - MUSE: NORMAL MMHG
T AXIS - MUSE: 46 DEGREES
TROPONIN T SERPL HS-MCNC: 6 NG/L
VENTRICULAR RATE- MUSE: 61 BPM
WBC # BLD AUTO: 4 10E3/UL (ref 4–11)

## 2024-06-05 PROCEDURE — 99285 EMERGENCY DEPT VISIT HI MDM: CPT | Mod: 25

## 2024-06-05 PROCEDURE — 80048 BASIC METABOLIC PNL TOTAL CA: CPT | Performed by: EMERGENCY MEDICINE

## 2024-06-05 PROCEDURE — 85379 FIBRIN DEGRADATION QUANT: CPT | Performed by: EMERGENCY MEDICINE

## 2024-06-05 PROCEDURE — 85025 COMPLETE CBC W/AUTO DIFF WBC: CPT | Performed by: EMERGENCY MEDICINE

## 2024-06-05 PROCEDURE — 93005 ELECTROCARDIOGRAM TRACING: CPT

## 2024-06-05 PROCEDURE — 84484 ASSAY OF TROPONIN QUANT: CPT | Performed by: EMERGENCY MEDICINE

## 2024-06-05 PROCEDURE — 36415 COLL VENOUS BLD VENIPUNCTURE: CPT | Performed by: EMERGENCY MEDICINE

## 2024-06-05 PROCEDURE — 71046 X-RAY EXAM CHEST 2 VIEWS: CPT

## 2024-06-05 ASSESSMENT — COLUMBIA-SUICIDE SEVERITY RATING SCALE - C-SSRS
2. HAVE YOU ACTUALLY HAD ANY THOUGHTS OF KILLING YOURSELF IN THE PAST MONTH?: NO
1. IN THE PAST MONTH, HAVE YOU WISHED YOU WERE DEAD OR WISHED YOU COULD GO TO SLEEP AND NOT WAKE UP?: NO
6. HAVE YOU EVER DONE ANYTHING, STARTED TO DO ANYTHING, OR PREPARED TO DO ANYTHING TO END YOUR LIFE?: NO

## 2024-06-05 ASSESSMENT — ACTIVITIES OF DAILY LIVING (ADL)
ADLS_ACUITY_SCORE: 33
ADLS_ACUITY_SCORE: 35
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
## 2024-06-05 NOTE — ED TRIAGE NOTES
Pt presents for evaluation of left sided chest pain with movements, worse when using muscles to move. Has been intermittent for last few days. Became more severe today. Described as a continuous dull pain, then sharp with activity. Currently rated 5/10. Rest eases pain. Went to [facility], had an EKG and was sent for further evaluation.

## 2024-06-05 NOTE — DISCHARGE INSTRUCTIONS
It was a pleasure taking care of you today. I hope you feel much better soon.  Please take ibuprofen as a trial for the next 2-3 days. I recommend 600 mg every 6-8 hours.  Please follow-up with your primary care doctor in 1 week.  Discussed imaging regarding the rib shadow versus pulmonary nodule with Dr. Echavarria.  Return immediately for worse pain, shorness of breath, or any other concerns.

## 2024-09-06 DIAGNOSIS — G35 MULTIPLE SCLEROSIS (H): ICD-10-CM

## 2024-09-06 RX ORDER — INTERFERON BETA-1A 44 UG/.5ML
INJECTION, SOLUTION SUBCUTANEOUS
Qty: 6 ML | Refills: 11 | Status: SHIPPED | OUTPATIENT
Start: 2024-09-06 | End: 2024-09-24

## 2024-09-06 NOTE — TELEPHONE ENCOUNTER
Received refill request for Rebif from [facility]; Patient was last seen in September and has follow up appointment in September with Dr. Way. Refilled per MS refill protocol.    Mansi Salguero RN

## 2024-09-24 ENCOUNTER — OFFICE VISIT (OUTPATIENT)
Dept: NEUROLOGY | Facility: CLINIC | Age: 64
End: 2024-09-24
Attending: PSYCHIATRY & NEUROLOGY
Payer: MEDICARE

## 2024-09-24 VITALS
SYSTOLIC BLOOD PRESSURE: 119 MMHG | HEART RATE: 65 BPM | WEIGHT: 108.7 LBS | BODY MASS INDEX: 19.26 KG/M2 | OXYGEN SATURATION: 98 % | DIASTOLIC BLOOD PRESSURE: 79 MMHG

## 2024-09-24 DIAGNOSIS — G35 MULTIPLE SCLEROSIS (H): ICD-10-CM

## 2024-09-24 PROCEDURE — 99214 OFFICE O/P EST MOD 30 MIN: CPT | Performed by: PSYCHIATRY & NEUROLOGY

## 2024-09-24 PROCEDURE — G0463 HOSPITAL OUTPT CLINIC VISIT: HCPCS | Performed by: PSYCHIATRY & NEUROLOGY

## 2024-09-24 PROCEDURE — G2211 COMPLEX E/M VISIT ADD ON: HCPCS | Performed by: PSYCHIATRY & NEUROLOGY

## 2024-09-24 RX ORDER — INTERFERON BETA-1A 44 UG/.5ML
INJECTION, SOLUTION SUBCUTANEOUS
Qty: 6 ML | Refills: 11 | Status: SHIPPED | OUTPATIENT
Start: 2024-09-24

## 2024-09-24 RX ORDER — DALFAMPRIDINE 10 MG/1
TABLET, FILM COATED, EXTENDED RELEASE ORAL
Qty: 60 TABLET | Refills: 11 | Status: SHIPPED | OUTPATIENT
Start: 2024-09-24

## 2024-09-24 RX ORDER — BACLOFEN 10 MG/1
10 TABLET ORAL 3 TIMES DAILY
Qty: 270 TABLET | Refills: 3 | Status: SHIPPED | OUTPATIENT
Start: 2024-09-24

## 2024-09-24 ASSESSMENT — PAIN SCALES - GENERAL: PAINLEVEL: NO PAIN (0)

## 2024-09-24 NOTE — NURSING NOTE
Chief Complaint   Patient presents with    MS    RECHECK     Annual follow up      Vitals were taken and medications were reconciled.   Johnson Becker, EMT  10:33 AM     Continuous Video EEG

## 2024-09-24 NOTE — LETTER
9/24/2024       RE: Lydia William  [address] [place] [address]     Dear Colleague,    Thank you for referring your patient, Lydia William, to the [facility] MULTIPLE SCLEROSIS CLINIC [place] at [facility]. Please see a copy of my visit note below.    ID: Lydia William is a 64-year-old woman who I follow for multiple sclerosis.  She returns for annual follow-up.    HPI: Lydia has been stable from an MS perspective.  She feels her walking is unchanged compared to 1 year ago.  Her residual symptoms are leg weakness, poor balance, gait impairment and fatigue.  Sensation is fine and she has no bladder, visual, cognitive or bulbar symptoms.  She does have constipation.  She continues to take Rebif and tolerates it well.  She has secondary progressive MS and Rebif has some evidence of some benefit in terms of slowing or stopping progression, so we have been planning on continuing it through her mid 60s at least.  Her general medical health has been stable.  Blood counts and transaminases were normal in December.    Past Medical History:   Diagnosis Date     Multiple sclerosis (H)         Current Outpatient Medications:      baclofen (LIORESAL) 10 MG tablet, Take 1 tablet (10 mg) by mouth 3 times daily., Disp: 270 tablet, Rfl: 3     Cholecalciferol (VITAMIN D3) 400 UNITS CAPS, Take 1,000 mg by mouth daily, Disp: , Rfl:      dalfampridine (AMPYRA) 10 MG TB12 12 hr tablet, TAKE 1 TABLET BY MOUTH  EVERY 12 HOURS, Disp: 60 tablet, Rfl: 11     ibuprofen (ADVIL,MOTRIN) 200 MG tablet, Take 1-3 tablets by mouth daily as needed., Disp: , Rfl:      interferon beta-1a (REBIF) 44 MCG/0.5ML injection, INJECT 44MCG SUBCUTANEOUSLY THREE TIMES WEEKLY AT LEAST 48 HOURS APART, Disp: 6 mL, Rfl: 11     Multiple Vitamins-Minerals (MULTIVITAMIN WOMEN) TABS, Take 1 tablet by mouth daily, Disp: , Rfl:     Exam: She is alert and oriented.  Affect is bright and language functions  are normal.  Cranial nerves are unremarkable.  Muscle bulk and tone are normal.  Strength in the proximal arms is normal but finger abduction is weak at 4/5 bilaterally.  Hip flexion is weak at 4/5 bilaterally and knee flexion is weak at 4/5 on the left.  Ankle dorsiflexion strength is normal.  Finger-nose-finger and finger tapping are normal but toe tapping is clumsy bilaterally.  Light touch is intact in all 4 limbs.  Reflexes are pathologically brisk and symmetric.  She has an ataxic gait without spastic or hemiparetic component.  Tandem gait is impossible.  Romberg sign is absent.    Impression: Secondary progressive MS, stable on Rebif.  I spent 36 minutes on her care on the date of service including chart review and face-to-face time.  We again discussed how long to continue the Rebif.  I told her the progressive stage of MS eventually plateaus and the vast majority of patients, usually by the end of their 60s.  She has been stable but there is no way to dissociate whether that is due to the natural course of the disease or to the interferon.  We planned to continue it at least 1 more year and then reassess.  I did tell her about the recently revealed results of tolebrutinib which showed a benefit in reducing progression of disability and progressive MS.    The longitudinal plan of care for the diagnosis(es)/condition(s) as documented were addressed during this visit. Due to the added complexity in care, I will continue to support Lydia in the subsequent management and with ongoing continuity of care.     Plan:  Follow-up in 1 year    This note was completed in part using voice-recognition software, and some typographic errors may be present as a result.     Again, thank you for allowing me to participate in the care of your patient.      Sincerely,    Elijah Way MD

## 2024-09-27 NOTE — PROGRESS NOTES
ID: Lydia William is a 64-year-old woman who I follow for multiple sclerosis.  She returns for annual follow-up.    HPI: Lydia has been stable from an MS perspective.  She feels her walking is unchanged compared to 1 year ago.  Her residual symptoms are leg weakness, poor balance, gait impairment and fatigue.  Sensation is fine and she has no bladder, visual, cognitive or bulbar symptoms.  She does have constipation.  She continues to take Rebif and tolerates it well.  She has secondary progressive MS and Rebif has some evidence of some benefit in terms of slowing or stopping progression, so we have been planning on continuing it through her mid 60s at least.  Her general medical health has been stable.  Blood counts and transaminases were normal in December.    Past Medical History:   Diagnosis Date    Multiple sclerosis (H)         Current Outpatient Medications:     baclofen (LIORESAL) 10 MG tablet, Take 1 tablet (10 mg) by mouth 3 times daily., Disp: 270 tablet, Rfl: 3    Cholecalciferol (VITAMIN D3) 400 UNITS CAPS, Take 1,000 mg by mouth daily, Disp: , Rfl:     dalfampridine (AMPYRA) 10 MG TB12 12 hr tablet, TAKE 1 TABLET BY MOUTH  EVERY 12 HOURS, Disp: 60 tablet, Rfl: 11    ibuprofen (ADVIL,MOTRIN) 200 MG tablet, Take 1-3 tablets by mouth daily as needed., Disp: , Rfl:     interferon beta-1a (REBIF) 44 MCG/0.5ML injection, INJECT 44MCG SUBCUTANEOUSLY THREE TIMES WEEKLY AT LEAST 48 HOURS APART, Disp: 6 mL, Rfl: 11    Multiple Vitamins-Minerals (MULTIVITAMIN WOMEN) TABS, Take 1 tablet by mouth daily, Disp: , Rfl:     Exam: She is alert and oriented.  Affect is bright and language functions are normal.  Cranial nerves are unremarkable.  Muscle bulk and tone are normal.  Strength in the proximal arms is normal but finger abduction is weak at 4/5 bilaterally.  Hip flexion is weak at 4/5 bilaterally and knee flexion is weak at 4/5 on the left.  Ankle dorsiflexion strength is normal.  Finger-nose-finger and finger tapping  are normal but toe tapping is clumsy bilaterally.  Light touch is intact in all 4 limbs.  Reflexes are pathologically brisk and symmetric.  She has an ataxic gait without spastic or hemiparetic component.  Tandem gait is impossible.  Romberg sign is absent.    Impression: Secondary progressive MS, stable on Rebif.  I spent 36 minutes on her care on the date of service including chart review and face-to-face time.  We again discussed how long to continue the Rebif.  I told her the progressive stage of MS eventually plateaus and the vast majority of patients, usually by the end of their 60s.  She has been stable but there is no way to dissociate whether that is due to the natural course of the disease or to the interferon.  We planned to continue it at least 1 more year and then reassess.  I did tell her about the recently revealed results of tolebrutinib which showed a benefit in reducing progression of disability and progressive MS.    The longitudinal plan of care for the diagnosis(es)/condition(s) as documented were addressed during this visit. Due to the added complexity in care, I will continue to support Lydia in the subsequent management and with ongoing continuity of care.     Plan:  Follow-up in 1 year    This note was completed in part using voice-recognition software, and some typographic errors may be present as a result.

## 2025-01-02 ENCOUNTER — TELEPHONE (OUTPATIENT)
Dept: NEUROLOGY | Facility: CLINIC | Age: 65
End: 2025-01-02
Payer: MEDICARE

## 2025-01-02 NOTE — TELEPHONE ENCOUNTER
Prior Authorization Approval    Medication: REBIF REBIDOSE 44 MCG/0.5ML SC SOAJ  Authorization Effective Date: 1/1/2025  Authorization Expiration Date: 12/31/2025  Approved Dose/Quantity: 28 days  Reference #: CMM KEY: [id number]   Insurance Company: [employer] ([facility]) - Phone [phone] Fax [phone]  Expected CoPay: $    CoPay Card Available:      Financial Assistance Needed:   Which Pharmacy is filling the prescription: [facility] - [address]  Pharmacy Notified: Yes  Patient Notified: Yes        Thank you,    Dominique Lemon [facility]  Specialty Pharmacy Clinic Liaison - CardiologyNeurologyMultiple Sclerosis  [facility] [address]  [address] [place] [address]  Ph: [phone] Fax: [phone]  [email or web address]

## 2025-02-02 ENCOUNTER — HEALTH MAINTENANCE LETTER (OUTPATIENT)
Age: 65
End: 2025-02-02

## 2025-05-10 ENCOUNTER — HEALTH MAINTENANCE LETTER (OUTPATIENT)
Age: 65
End: 2025-05-10

## 2025-08-14 DIAGNOSIS — G35 MULTIPLE SCLEROSIS (H): ICD-10-CM

## 2025-08-15 RX ORDER — INTERFERON BETA-1A 44 UG/.5ML
INJECTION, SOLUTION SUBCUTANEOUS
Qty: 6 ML | Refills: 1 | Status: SHIPPED | OUTPATIENT
Start: 2025-08-15